# Patient Record
Sex: FEMALE | Race: WHITE | Employment: OTHER | URBAN - METROPOLITAN AREA
[De-identification: names, ages, dates, MRNs, and addresses within clinical notes are randomized per-mention and may not be internally consistent; named-entity substitution may affect disease eponyms.]

---

## 2019-04-11 ENCOUNTER — ANESTHESIA EVENT (OUTPATIENT)
Dept: SURGERY | Age: 79
End: 2019-04-11
Payer: MEDICARE

## 2019-04-12 ENCOUNTER — ANESTHESIA (OUTPATIENT)
Dept: SURGERY | Age: 79
End: 2019-04-12
Payer: MEDICARE

## 2019-04-12 ENCOUNTER — HOSPITAL ENCOUNTER (OUTPATIENT)
Age: 79
Discharge: HOME | End: 2019-04-12
Attending: PSYCHIATRY & NEUROLOGY | Admitting: PSYCHIATRY & NEUROLOGY
Payer: MEDICARE

## 2019-04-12 VITALS
HEART RATE: 84 BPM | BODY MASS INDEX: 24.41 KG/M2 | OXYGEN SATURATION: 97 % | RESPIRATION RATE: 10 BRPM | SYSTOLIC BLOOD PRESSURE: 141 MMHG | DIASTOLIC BLOOD PRESSURE: 65 MMHG | WEIGHT: 137.79 LBS | TEMPERATURE: 98.1 F | HEIGHT: 63 IN

## 2019-04-12 PROBLEM — F33.9 RECURRENT MAJOR DEPRESSIVE DISORDER (HCC): Status: ACTIVE | Noted: 2019-04-12

## 2019-04-12 PROCEDURE — 90870 ELECTROCONVULSIVE THERAPY: CPT | Performed by: PSYCHIATRY & NEUROLOGY

## 2019-04-12 PROCEDURE — 74011000250 HC RX REV CODE- 250

## 2019-04-12 PROCEDURE — 74011250636 HC RX REV CODE- 250/636

## 2019-04-12 PROCEDURE — 74780000002 HC ELECTROSHOCK THERAP RECOVERY: Performed by: PSYCHIATRY & NEUROLOGY

## 2019-04-12 RX ORDER — AMLODIPINE BESYLATE 2.5 MG/1
TABLET ORAL DAILY
COMMUNITY

## 2019-04-12 RX ORDER — SODIUM CHLORIDE, SODIUM LACTATE, POTASSIUM CHLORIDE, CALCIUM CHLORIDE 600; 310; 30; 20 MG/100ML; MG/100ML; MG/100ML; MG/100ML
100 INJECTION, SOLUTION INTRAVENOUS CONTINUOUS
Status: DISCONTINUED | OUTPATIENT
Start: 2019-04-12 | End: 2019-04-12 | Stop reason: HOSPADM

## 2019-04-12 RX ORDER — CLONAZEPAM 1 MG/1
1.5 TABLET ORAL
COMMUNITY

## 2019-04-12 RX ORDER — SUCCINYLCHOLINE CHLORIDE 20 MG/ML
INJECTION INTRAMUSCULAR; INTRAVENOUS AS NEEDED
Status: DISCONTINUED | OUTPATIENT
Start: 2019-04-12 | End: 2019-04-12 | Stop reason: HOSPADM

## 2019-04-12 RX ORDER — ONDANSETRON HYDROCHLORIDE 8 MG/1
8 TABLET, FILM COATED ORAL
COMMUNITY

## 2019-04-12 RX ORDER — SODIUM CHLORIDE 0.9 % (FLUSH) 0.9 %
5-40 SYRINGE (ML) INJECTION EVERY 8 HOURS
Status: DISCONTINUED | OUTPATIENT
Start: 2019-04-12 | End: 2019-04-12 | Stop reason: HOSPADM

## 2019-04-12 RX ORDER — CHOLECALCIFEROL TAB 125 MCG (5000 UNIT) 125 MCG
TAB ORAL DAILY
COMMUNITY

## 2019-04-12 RX ORDER — ACETAMINOPHEN 500 MG
500 TABLET ORAL
COMMUNITY

## 2019-04-12 RX ORDER — SODIUM CHLORIDE 0.9 % (FLUSH) 0.9 %
5-40 SYRINGE (ML) INJECTION AS NEEDED
Status: DISCONTINUED | OUTPATIENT
Start: 2019-04-12 | End: 2019-04-12 | Stop reason: HOSPADM

## 2019-04-12 RX ORDER — CALCIUM CARBONATE 600 MG
600 TABLET ORAL DAILY
Status: ON HOLD | COMMUNITY
End: 2019-10-23

## 2019-04-12 RX ORDER — HYDROCHLOROTHIAZIDE 12.5 MG/1
12.5 TABLET ORAL DAILY
Status: ON HOLD | COMMUNITY
End: 2019-11-20

## 2019-04-12 RX ORDER — QUETIAPINE FUMARATE 100 MG/1
50 TABLET, FILM COATED ORAL DAILY
Status: ON HOLD | COMMUNITY
End: 2019-11-20

## 2019-04-12 RX ORDER — METHOHEXITAL IN WATER/PF 100MG/10ML
SYRINGE (ML) INTRAVENOUS AS NEEDED
Status: DISCONTINUED | OUTPATIENT
Start: 2019-04-12 | End: 2019-04-12 | Stop reason: HOSPADM

## 2019-04-12 RX ORDER — HYDROMORPHONE HYDROCHLORIDE 1 MG/ML
.25-1 INJECTION, SOLUTION INTRAMUSCULAR; INTRAVENOUS; SUBCUTANEOUS
Status: DISCONTINUED | OUTPATIENT
Start: 2019-04-12 | End: 2019-04-12 | Stop reason: HOSPADM

## 2019-04-12 RX ORDER — BUPROPION HYDROCHLORIDE 150 MG/1
150 TABLET ORAL DAILY
COMMUNITY

## 2019-04-12 RX ORDER — LIDOCAINE HYDROCHLORIDE 10 MG/ML
0.1 INJECTION, SOLUTION EPIDURAL; INFILTRATION; INTRACAUDAL; PERINEURAL AS NEEDED
Status: DISCONTINUED | OUTPATIENT
Start: 2019-04-12 | End: 2019-04-12 | Stop reason: HOSPADM

## 2019-04-12 RX ORDER — GLYCOPYRROLATE 0.2 MG/ML
INJECTION INTRAMUSCULAR; INTRAVENOUS AS NEEDED
Status: DISCONTINUED | OUTPATIENT
Start: 2019-04-12 | End: 2019-04-12 | Stop reason: HOSPADM

## 2019-04-12 RX ORDER — MAGNESIUM CITRATE
148 SOLUTION, ORAL ORAL DAILY
COMMUNITY

## 2019-04-12 RX ADMIN — Medication 60 MG: at 08:26

## 2019-04-12 RX ADMIN — SUCCINYLCHOLINE CHLORIDE 50 MG: 20 INJECTION INTRAMUSCULAR; INTRAVENOUS at 08:26

## 2019-04-12 RX ADMIN — GLYCOPYRROLATE 0.2 MG: 0.2 INJECTION INTRAMUSCULAR; INTRAVENOUS at 08:22

## 2019-04-12 NOTE — BH NOTES
ECT DISCHARGE SUMMARY        IDENTIFICATION:    Patient Name  Jana Knight   Date of Birth 1940   [facility] [id number]   Medical Record Number  [id number]      Age  66 y.o. Admit date:  4/12/2019    Date of Service  4/12/2019              PROVISIONAL & DISCHARGE DIAGNOSES:      Active Hospital Problems    Recurrent major depressive disorder [facility])               ECT:  # Q 6 weeks Maintenance/Continuation treatment   Jana Knight admitted to the PACU for ECT. Patient is medically cleared and NPO for procedure. Patient received Bilateral  35% Energy, under general anesthesia. Jana Knight with a good, well generalized seizure of 27 seconds by EEG. Recovery was unremarkable and with no complications. Patient gave informed consent for ECT treatment. Please see anesthesia report for details regarding medications administered during procedure. DISCHARGE MEDICATIONS: (no changes made). Informed consent given for the use of following psychotropic medications:  Current Discharge Medication List      CONTINUE these medications which have NOT CHANGED    Details   acetaminophen (TYLENOL) 500 mg tablet Take 500 mg by mouth every six (6) hours as needed for Pain (pain). buPROPion XL (WELLBUTRIN XL) 150 mg tablet Take 150 mg by mouth daily. magnesium citrate solution Take 148 mL by mouth daily. QUEtiapine (SEROQUEL) 100 mg tablet Take 50 mg by mouth daily. cholecalciferol, VITAMIN D3, (VITAMIN D3) 5,000 unit tab tablet Take  by mouth daily. amLODIPine (NORVASC) 2.5 mg tablet Take  by mouth daily. hydroCHLOROthiazide (HYDRODIURIL) 12.5 mg tablet Take 12.5 mg by mouth daily. calcium carbonate (CALTREX) 600 mg calcium (1,500 mg) tablet Take 600 mg by mouth daily. clonazePAM (KLONOPIN) 1 mg tablet Take 1.5 mg by mouth nightly. Menthol-Zinc Oxide (REMEDY) 0.2-20 % pste topical paste by Apply Externally route DIALYSIS PRN (diaper change).       dextran 70-hypromellose (ARTIFICIAL TEARS,[id number],) ophthalmic solution Administer 1 Drop to both eyes as needed. ondansetron hcl (ZOFRAN) 8 mg tablet Take 8 mg by mouth every eight (8) hours as needed for Nausea. DISPOSITION:    Home. Patient to f/u with o/p ECT in 6 weeks months. Pt to f/u with regular o/p psychiatrist as indicated/directed. TYPE OF DISCHARGE: REGULAR               CONDITION AT DISCHARGE: stable  Overall, patient seems to be doing well with ECT. SIGNED:    Brandi Damon MD  2019                              ECT DISCHARGE INSTRUCTIONS      NAME: Billy Dalton   :  1940   MRN:  [id number]     Date/Time:  2019 8:39 AM    ADMIT DATE: 2019     DISCHARGE DATE: 2019     DISCHARGE DIAGNOSIS:  No discharge information exists for this patient. Recommended diet:  As tolerated. Recommended activity:      Have a responsible person stay with the patient for 24 hours after the treatment, some temporary confusion may be noticed. Do not allow a patient to operate a motor vehicle for at least 24 hours and all the confusion has cleared. Do not drink alcoholic beverages for 48 hours past treatment. Do not sign any legal documents. Do not make any critical decisions for 24 hours. Advance diet as tolerated. Start with liquids and advance it nausea is not present. Understand that memory for recent events, phone numbers, addresses, ect. May be decreased for a short period of time. Report any persistant nausea or pain to the treating physician. Patient receiving ECT while in the hospital should also not attempt to ambulate without assistance, please use tap bell which will be provide. If you have questions regarding the hospital related prescriptions or hospital related issues please call [address] [place] [address] at [phone].     If you experience any of the following symptoms then please call your primary care physician/outpatient psychiatrist or return to the emergency room if you cannot get hold of your doctor: Fever, chills, nausea, vomiting, diarrhea, change in mentation, falling, bleeding, shortness of breath. Follow Up:  Continue outpatient psychiatric follow-up visits with your personal psychiatrist.       Information obtained by :  I understand that if any problems occur once I am at home I am to contact my physician. I understand and acknowledge receipt of the instructions indicated above.

## 2019-04-12 NOTE — ANESTHESIA POSTPROCEDURE EVALUATION
Procedure(s): ELECTRO CONVULSIVE THERAPY. general 
 
Anesthesia Post Evaluation Multimodal analgesia: multimodal analgesia not used between 6 hours prior to anesthesia start to PACU discharge Patient location during evaluation: PACU Patient participation: complete - patient participated Level of consciousness: awake Pain management: adequate Airway patency: patent Anesthetic complications: no 
Cardiovascular status: acceptable, blood pressure returned to baseline and hemodynamically stable Respiratory status: acceptable Hydration status: acceptable Vitals Value Taken Time /75 4/12/2019  8:50 AM  
Temp 36.5 °C (97.7 °F) 4/12/2019  8:41 AM  
Pulse 86 4/12/2019  8:54 AM  
Resp 11 4/12/2019  8:54 AM  
SpO2 97 % 4/12/2019  8:54 AM  
Vitals shown include unvalidated device data.

## 2019-04-12 NOTE — ANESTHESIA PREPROCEDURE EVALUATION
Relevant Problems No relevant active problems Anesthetic History No history of anesthetic complications Review of Systems / Medical History Patient summary reviewed and pertinent labs reviewed Pulmonary Within defined limits Neuro/Psych Psychiatric history Cardiovascular Hypertension GI/Hepatic/Renal 
Within defined limits Endo/Other Within defined limits Other Findings Physical Exam 
 
Airway Mallampati: II 
TM Distance: 4 - 6 cm Neck ROM: normal range of motion Mouth opening: Normal 
 
 Cardiovascular Regular rate and rhythm,  S1 and S2 normal,  no murmur, click, rub, or gallop Rhythm: regular Rate: normal 
 
 
 
 Dental 
 
Dentition: Poor dentition Pulmonary Breath sounds clear to auscultation Abdominal 
GI exam deferred Other Findings Anesthetic Plan ASA: 3 Anesthesia type: general 
 
 
 
 
Induction: Intravenous Anesthetic plan and risks discussed with: Patient

## 2019-04-12 NOTE — BH NOTES
ECT PROCEDURE NOTE      IDENTIFICATION:    Patient Name  Jeffrey Conrad   Date of Birth 1940   [facility] [id number]   Medical Record Number  [id number]      Age  66 y.o. PCP Karla Rogers MD   Admit date:  4/12/2019    Room Number  OR/PL  @ [facility]   Date of Service  4/12/2019            ECT:  # q6 weeks Maintenance/Continuation treatment   Jeffrey Conrad admitted to the PACU for ECT. Patient is medically cleared and NPO for procedure. Patient received Bilateral 35% Energy, under general anesthesia. Jeffrey Conrad with a good, well generalized seizure of 27 seconds by EEG and  seconds by EMG. Recovery was unremarkable and with no complications. Patient gave informed consent for ECT treatment.     Anesthesia medications administered during procedure:  Brevital: 60 mg  Propofol: 0 mg  Glycopyrrolate: 0.2 mg  Succinylcholine: 50 mg   Esmolol:  mg  Lorazepam:  mg  Ketamine:  mg  Zofran:   mg    Toradol:  mg  Lidocaine:  mg  Caffeine Benzoate: mg       SIGNED:    Desi Patton MD  4/12/2019

## 2019-04-26 ENCOUNTER — ANESTHESIA EVENT (OUTPATIENT)
Dept: SURGERY | Age: 79
End: 2019-04-26
Payer: MEDICARE

## 2019-05-03 ENCOUNTER — ANESTHESIA (OUTPATIENT)
Dept: SURGERY | Age: 79
End: 2019-05-03
Payer: MEDICARE

## 2019-05-03 ENCOUNTER — HOSPITAL ENCOUNTER (OUTPATIENT)
Age: 79
Setting detail: OUTPATIENT SURGERY
Discharge: HOME | End: 2019-05-03
Attending: PSYCHIATRY & NEUROLOGY | Admitting: PSYCHIATRY & NEUROLOGY
Payer: MEDICARE

## 2019-05-03 VITALS
WEIGHT: 155 LBS | TEMPERATURE: 98.6 F | RESPIRATION RATE: 16 BRPM | OXYGEN SATURATION: 98 % | HEART RATE: 90 BPM | HEIGHT: 63 IN | SYSTOLIC BLOOD PRESSURE: 138 MMHG | DIASTOLIC BLOOD PRESSURE: 87 MMHG | BODY MASS INDEX: 27.46 KG/M2

## 2019-05-03 PROCEDURE — 90870 ELECTROCONVULSIVE THERAPY: CPT | Performed by: PSYCHIATRY & NEUROLOGY

## 2019-05-03 PROCEDURE — 74780000002 HC ELECTROSHOCK THERAP RECOVERY: Performed by: PSYCHIATRY & NEUROLOGY

## 2019-05-03 PROCEDURE — 74011250636 HC RX REV CODE- 250/636

## 2019-05-03 RX ORDER — SODIUM CHLORIDE 0.9 % (FLUSH) 0.9 %
5-40 SYRINGE (ML) INJECTION AS NEEDED
Status: DISCONTINUED | OUTPATIENT
Start: 2019-05-03 | End: 2019-05-03 | Stop reason: HOSPADM

## 2019-05-03 RX ORDER — SODIUM CHLORIDE 0.9 % (FLUSH) 0.9 %
5-40 SYRINGE (ML) INJECTION EVERY 8 HOURS
Status: DISCONTINUED | OUTPATIENT
Start: 2019-05-03 | End: 2019-05-03 | Stop reason: HOSPADM

## 2019-05-03 RX ORDER — SUCCINYLCHOLINE CHLORIDE 20 MG/ML
INJECTION INTRAMUSCULAR; INTRAVENOUS AS NEEDED
Status: DISCONTINUED | OUTPATIENT
Start: 2019-05-03 | End: 2019-05-03 | Stop reason: HOSPADM

## 2019-05-03 RX ORDER — SODIUM CHLORIDE 9 MG/ML
50 INJECTION, SOLUTION INTRAVENOUS CONTINUOUS
Status: DISCONTINUED | OUTPATIENT
Start: 2019-05-03 | End: 2019-05-03 | Stop reason: HOSPADM

## 2019-05-03 RX ORDER — LIDOCAINE HYDROCHLORIDE 10 MG/ML
0.1 INJECTION, SOLUTION EPIDURAL; INFILTRATION; INTRACAUDAL; PERINEURAL AS NEEDED
Status: DISCONTINUED | OUTPATIENT
Start: 2019-05-03 | End: 2019-05-03 | Stop reason: HOSPADM

## 2019-05-03 RX ORDER — HYDROMORPHONE HYDROCHLORIDE 1 MG/ML
0.5 INJECTION, SOLUTION INTRAMUSCULAR; INTRAVENOUS; SUBCUTANEOUS
Status: DISCONTINUED | OUTPATIENT
Start: 2019-05-03 | End: 2019-05-03 | Stop reason: HOSPADM

## 2019-05-03 RX ORDER — SODIUM CHLORIDE, SODIUM LACTATE, POTASSIUM CHLORIDE, CALCIUM CHLORIDE 600; 310; 30; 20 MG/100ML; MG/100ML; MG/100ML; MG/100ML
50 INJECTION, SOLUTION INTRAVENOUS CONTINUOUS
Status: DISCONTINUED | OUTPATIENT
Start: 2019-05-03 | End: 2019-05-03 | Stop reason: HOSPADM

## 2019-05-03 RX ADMIN — SUCCINYLCHOLINE CHLORIDE 50 MG: 20 INJECTION INTRAMUSCULAR; INTRAVENOUS at 09:04

## 2019-05-03 NOTE — ANESTHESIA PREPROCEDURE EVALUATION
Relevant Problems No relevant active problems Anesthetic History No history of anesthetic complications Review of Systems / Medical History Patient summary reviewed and pertinent labs reviewed Pulmonary Within defined limits Neuro/Psych Psychiatric history Cardiovascular Hypertension GI/Hepatic/Renal 
Within defined limits Endo/Other Within defined limits Other Findings Relevant Problems No relevant active problems Anesthetic History No history of anesthetic complications Review of Systems / Medical History Patient summary reviewed and pertinent labs reviewed Pulmonary Within defined limits Neuro/Psych Psychiatric history Cardiovascular Hypertension GI/Hepatic/Renal 
Within defined limits Endo/Other Within defined limits Other Findings Physical Exam 
 
Airway Mallampati: II 
TM Distance: 4 - 6 cm Neck ROM: normal range of motion Mouth opening: Normal 
 
 Cardiovascular Regular rate and rhythm,  S1 and S2 normal,  no murmur, click, rub, or gallop Rhythm: regular Rate: normal 
 
 
 
 Dental 
 
Dentition: Poor dentition Pulmonary Breath sounds clear to auscultation Abdominal 
GI exam deferred Other Findings Anesthetic Plan ASA: 3 Anesthesia type: general 
 
 
 
 
Induction: Intravenous Anesthetic plan and risks discussed with: Patient Relevant Problems No relevant active problems Anesthetic History No history of anesthetic complications Review of Systems / Medical History Patient summary reviewed and pertinent labs reviewed Pulmonary Within defined limits Neuro/Psych Psychiatric history Cardiovascular Hypertension GI/Hepatic/Renal 
Within defined limits Endo/Other Within defined limits Other Findings Physical Exam 
 
Airway Mallampati: II 
TM Distance: 4 - 6 cm Neck ROM: normal range of motion Mouth opening: Normal 
 
 Cardiovascular Regular rate and rhythm,  S1 and S2 normal,  no murmur, click, rub, or gallop Rhythm: regular Rate: normal 
 
 
 
 Dental 
 
Dentition: Poor dentition Pulmonary Breath sounds clear to auscultation Abdominal 
GI exam deferred Other Findings Anesthetic Plan ASA: 3 Anesthesia type: general 
 
 
 
 
Induction: Intravenous Anesthetic plan and risks discussed with: Patient Physical Exam 
 
Airway Mallampati: II 
TM Distance: 4 - 6 cm Neck ROM: normal range of motion Mouth opening: Normal 
 
 Cardiovascular Regular rate and rhythm,  S1 and S2 normal,  no murmur, click, rub, or gallop Rhythm: regular Rate: normal 
 
 
 
 Dental 
 
Dentition: Poor dentition Pulmonary Breath sounds clear to auscultation Abdominal 
GI exam deferred Other Findings Anesthetic Plan ASA: 3 Anesthesia type: general 
 
 
 
 
Induction: Intravenous Anesthetic plan and risks discussed with: Patient

## 2019-05-03 NOTE — DISCHARGE INSTRUCTIONS
ECT DISCHARGE INSTRUCTIONS      NAME: Jana Knight   :  1940   MRN:  [id number]     Date/Time:  5/3/2019 9:09 AM    ADMIT DATE: 5/3/2019     DISCHARGE DATE: 5/3/2019     DISCHARGE DIAGNOSIS:  There are no discharge diagnoses documented for the most recent discharge. Recommended diet:  As tolerated. Recommended activity:      Have a responsible person stay with the patient for 24 hours after the treatment, some temporary confusion may be noticed. Do not allow a patient to operate a motor vehicle for at least 24 hours and all the confusion has cleared. Do not drink alcoholic beverages for 48 hours past treatment. Do not sign any legal documents. Do not make any critical decisions for 24 hours. Advance diet as tolerated. Start with liquids and advance it nausea is not present. Understand that memory for recent events, phone numbers, addresses, ect. May be decreased for a short period of time. Report any persistant nausea or pain to the treating physician. Patient receiving ECT while in the hospital should also not attempt to ambulate without assistance, please use tap bell which will be provide. If you have questions regarding the hospital related prescriptions or hospital related issues please call [address] [place] [address] at [phone]. If you experience any of the following symptoms then please call your primary care physician/outpatient psychiatrist or return to the emergency room if you cannot get hold of your doctor: Fever, chills, nausea, vomiting, diarrhea, change in mentation, falling, bleeding, shortness of breath. Follow Up:  Continue outpatient psychiatric follow-up visits with your personal psychiatrist.       Information obtained by :  I understand that if any problems occur once I am at home I am to contact my physician. I understand and acknowledge receipt of the instructions indicated above. Physician's or R.N.'s Signature                                                             Date/Time                                                                                                                                              Patient or Representative Signature                                                     Date/Time            DISCHARGE SUMMARY from Nurse    PATIENT INSTRUCTIONS:    After general anesthesia or intravenous sedation, for 24 hours or while taking prescription Narcotics:  · Limit your activities  · Do not drive and operate hazardous machinery  · Do not make important personal or business decisions  · Do  not drink alcoholic beverages  · If you have not urinated within 8 hours after discharge, please contact your surgeon on call. Report the following to your surgeon:  · Excessive pain, swelling, redness or odor of or around the surgical area  · Temperature over 100.5  · Nausea and vomiting lasting longer than 4 hours or if unable to take medications  · Any signs of decreased circulation or nerve impairment to extremity: change in color, persistent  numbness, tingling, coldness or increase pain  · Any questions    *  Please give a list of your current medications to your Primary Care Provider. *  Please update this list whenever your medications are discontinued, doses are      changed, or new medications (including over-the-counter products) are added. *  Please carry medication information at all times in case of emergency situations. These are general instructions for a healthy lifestyle:    No smoking/ No tobacco products/ Avoid exposure to second hand smoke  Surgeon General's Warning:  Quitting smoking now greatly reduces serious risk to your health.     Obesity, smoking, and sedentary lifestyle greatly increases your risk for illness    A healthy diet, regular physical exercise & weight monitoring are important for maintaining a healthy lifestyle    You may be retaining fluid if you have a history of heart failure or if you experience any of the following symptoms:  Weight gain of 3 pounds or more overnight or 5 pounds in a week, increased swelling in our hands or feet or shortness of breath while lying flat in bed. Please call your doctor as soon as you notice any of these symptoms; do not wait until your next office visit. Recognize signs and symptoms of STROKE:    F-face looks uneven    A-arms unable to move or move unevenly    S-speech slurred or non-existent    T-time-call 911 as soon as signs and symptoms begin-DO NOT go       Back to bed or wait to see if you get better-TIME IS BRAIN. Warning Signs of HEART ATTACK     Call 911 if you have these symptoms:   Chest discomfort. Most heart attacks involve discomfort in the center of the chest that lasts more than a few minutes, or that goes away and comes back. It can feel like uncomfortable pressure, squeezing, fullness, or pain.  Discomfort in other areas of the upper body. Symptoms can include pain or discomfort in one or both arms, the back, neck, jaw, or stomach.  Shortness of breath with or without chest discomfort.  Other signs may include breaking out in a cold sweat, nausea, or lightheadedness. Don't wait more than five minutes to call 911 - MINUTES MATTER! Fast action can save your life. Calling 911 is almost always the fastest way to get lifesaving treatment. Emergency Medical Services staff can begin treatment when they arrive -- up to an hour sooner than if someone gets to the hospital by car. The discharge information has been reviewed with the patient and caregiver. The patient and caregiver verbalized understanding.   Discharge medications reviewed with the patient and caregiver and appropriate educational materials and side effects teaching were provided.   ___________________________________________________________________________________________________________________________________

## 2019-05-03 NOTE — ANESTHESIA POSTPROCEDURE EVALUATION
Procedure(s): ELECTRO CONVULSIVE THERAPY. general 
 
Anesthesia Post Evaluation Multimodal analgesia: multimodal analgesia not used between 6 hours prior to anesthesia start to PACU discharge Patient location during evaluation: PACU Patient participation: complete - patient cannot participate Level of consciousness: awake Pain management: adequate Airway patency: patent Anesthetic complications: no 
Cardiovascular status: acceptable Respiratory status: acceptable Hydration status: acceptable Post anesthesia nausea and vomiting:  none Vitals Value Taken Time /61 5/3/2019 10:00 AM  
Temp 37 °C (98.6 °F) 5/3/2019  9:20 AM  
Pulse 85 5/3/2019 10:10 AM  
Resp 16 5/3/2019 10:10 AM  
SpO2 97 % 5/3/2019 10:10 AM  
Vitals shown include unvalidated device data.

## 2019-05-03 NOTE — PROCEDURES
ECT PROCEDURE NOTE      IDENTIFICATION:    Patient Name  Levar Limon   Date of Birth 1940   [facility] [id number]   Medical Record Number  [id number]      Age  66 y.o. PCP Sujatha Brandon MD   Admit date:  5/3/2019    Room Number  PACU/PL  @ [facility]   Date of Service  5/3/2019            Electro Convulsive Therapy:  Treatment number 23     Maintenance ECT yes Q4 weeks   Levar Limon was admitted to the PACU for ECT. Patient was medically cleared and NPO for procedure. Patient gave informed consent for ECT treatment. Patient received     Bilateral ECT yes    Right Unilateral ECT     at 40 % Energy, under general anesthesia. Levar Limon with a good, well generalized seizure of 26 seconds on observation of the motor manifestations of the seizure. EEG duration was NA secs. Recovery was unremarkable and with no complications.      Change in Energy %:             Anesthesia medications administered during procedure:  Brevital:  60 mg  Change for next treament:     Succinylcholine: 50 mg  Change for next treatment:  Decrease to 40mg    Propofol: mg  Glycopyrrolate:  mg  Labetalol:  mg  Esmolol:  mg  Lorazepam:  mg  Ketamine:  mg  Zofran:   mg    Toradol:  mg  Lidocaine:  mg  Caffeine Benzoate: mg       Skin to be cleaned with Alcohol swab prior to procedure    SIGNED:    Bella Downs MD  5/3/2019

## 2019-05-03 NOTE — PERIOP NOTES
Discharge instructions explained to patient and RN, pt taken via wheelchair out of PACU, pt stable for discharge

## 2019-05-23 ENCOUNTER — ANESTHESIA EVENT (OUTPATIENT)
Dept: SURGERY | Age: 79
End: 2019-05-23
Payer: COMMERCIAL

## 2019-05-23 RX ORDER — SODIUM CHLORIDE 0.9 % (FLUSH) 0.9 %
5-40 SYRINGE (ML) INJECTION EVERY 8 HOURS
Status: CANCELLED | OUTPATIENT
Start: 2019-05-23

## 2019-05-23 RX ORDER — HYDROMORPHONE HYDROCHLORIDE 1 MG/ML
0.5 INJECTION, SOLUTION INTRAMUSCULAR; INTRAVENOUS; SUBCUTANEOUS
Status: CANCELLED | OUTPATIENT
Start: 2019-05-23

## 2019-05-23 RX ORDER — SODIUM CHLORIDE 0.9 % (FLUSH) 0.9 %
5-40 SYRINGE (ML) INJECTION AS NEEDED
Status: CANCELLED | OUTPATIENT
Start: 2019-05-23

## 2019-05-23 RX ORDER — FENTANYL CITRATE 50 UG/ML
25 INJECTION, SOLUTION INTRAMUSCULAR; INTRAVENOUS
Status: CANCELLED | OUTPATIENT
Start: 2019-05-23

## 2019-05-24 ENCOUNTER — HOSPITAL ENCOUNTER (OUTPATIENT)
Age: 79
Setting detail: OUTPATIENT SURGERY
Discharge: HOME | End: 2019-05-24
Attending: PSYCHIATRY & NEUROLOGY | Admitting: PSYCHIATRY & NEUROLOGY
Payer: COMMERCIAL

## 2019-05-24 ENCOUNTER — ANESTHESIA (OUTPATIENT)
Dept: SURGERY | Age: 79
End: 2019-05-24
Payer: COMMERCIAL

## 2019-05-24 VITALS
SYSTOLIC BLOOD PRESSURE: 132 MMHG | BODY MASS INDEX: 27.46 KG/M2 | DIASTOLIC BLOOD PRESSURE: 55 MMHG | WEIGHT: 154.98 LBS | HEART RATE: 92 BPM | OXYGEN SATURATION: 96 % | RESPIRATION RATE: 20 BRPM | HEIGHT: 63 IN | TEMPERATURE: 98.5 F

## 2019-05-24 PROCEDURE — 90870 ELECTROCONVULSIVE THERAPY: CPT | Performed by: PSYCHIATRY & NEUROLOGY

## 2019-05-24 PROCEDURE — 74011250636 HC RX REV CODE- 250/636: Performed by: ANESTHESIOLOGY

## 2019-05-24 PROCEDURE — 74780000002 HC ELECTROSHOCK THERAP RECOVERY: Performed by: PSYCHIATRY & NEUROLOGY

## 2019-05-24 PROCEDURE — 74011250636 HC RX REV CODE- 250/636

## 2019-05-24 PROCEDURE — 74011000250 HC RX REV CODE- 250

## 2019-05-24 RX ORDER — SODIUM CHLORIDE 0.9 % (FLUSH) 0.9 %
5-40 SYRINGE (ML) INJECTION EVERY 8 HOURS
Status: DISCONTINUED | OUTPATIENT
Start: 2019-05-24 | End: 2019-05-24 | Stop reason: HOSPADM

## 2019-05-24 RX ORDER — SODIUM CHLORIDE, SODIUM LACTATE, POTASSIUM CHLORIDE, CALCIUM CHLORIDE 600; 310; 30; 20 MG/100ML; MG/100ML; MG/100ML; MG/100ML
50 INJECTION, SOLUTION INTRAVENOUS CONTINUOUS
Status: DISCONTINUED | OUTPATIENT
Start: 2019-05-24 | End: 2019-05-24 | Stop reason: HOSPADM

## 2019-05-24 RX ORDER — SODIUM CHLORIDE 0.9 % (FLUSH) 0.9 %
5-40 SYRINGE (ML) INJECTION AS NEEDED
Status: DISCONTINUED | OUTPATIENT
Start: 2019-05-24 | End: 2019-05-24 | Stop reason: HOSPADM

## 2019-05-24 RX ORDER — SODIUM CHLORIDE 9 MG/ML
50 INJECTION, SOLUTION INTRAVENOUS CONTINUOUS
Status: DISCONTINUED | OUTPATIENT
Start: 2019-05-24 | End: 2019-05-24 | Stop reason: HOSPADM

## 2019-05-24 RX ORDER — SUCCINYLCHOLINE CHLORIDE 20 MG/ML
INJECTION INTRAMUSCULAR; INTRAVENOUS AS NEEDED
Status: DISCONTINUED | OUTPATIENT
Start: 2019-05-24 | End: 2019-05-24 | Stop reason: HOSPADM

## 2019-05-24 RX ORDER — LIDOCAINE HYDROCHLORIDE 10 MG/ML
0.1 INJECTION, SOLUTION EPIDURAL; INFILTRATION; INTRACAUDAL; PERINEURAL AS NEEDED
Status: DISCONTINUED | OUTPATIENT
Start: 2019-05-24 | End: 2019-05-24 | Stop reason: HOSPADM

## 2019-05-24 RX ADMIN — SODIUM CHLORIDE: 900 INJECTION, SOLUTION INTRAVENOUS at 09:59

## 2019-05-24 RX ADMIN — SUCCINYLCHOLINE CHLORIDE 40 MG: 20 INJECTION INTRAMUSCULAR; INTRAVENOUS at 10:27

## 2019-05-24 NOTE — ANESTHESIA POSTPROCEDURE EVALUATION
Procedure(s):  ELECTRO CONVULSIVE THERAPY. general    Anesthesia Post Evaluation      Multimodal analgesia: multimodal analgesia not used between 6 hours prior to anesthesia start to PACU discharge  Patient location during evaluation: PACU  Patient participation: complete - patient participated  Level of consciousness: awake and alert  Pain management: adequate  Airway patency: patent  Anesthetic complications: no  Cardiovascular status: acceptable  Respiratory status: acceptable  Hydration status: acceptable  Post anesthesia nausea and vomiting:  none      Vitals Value Taken Time   /55 5/24/2019 11:30 AM   Temp 36.9 °C (98.5 °F) 5/24/2019 11:29 AM   Pulse 92 5/24/2019 11:30 AM   Resp 15 5/24/2019 11:30 AM   SpO2 97 % 5/24/2019 11:30 AM   Vitals shown include unvalidated device data.

## 2019-05-24 NOTE — DISCHARGE INSTRUCTIONS
ECT DISCHARGE INSTRUCTIONS      NAME: Kathi Galeas   :  1940   MRN:  [id number]     Date/Time:  2019 10:32 AM    ADMIT DATE: 2019     DISCHARGE DATE: 2019     DISCHARGE DIAGNOSIS:  There are no discharge diagnoses documented for the most recent discharge. Recommended diet:  As tolerated. Recommended activity:      Have a responsible person stay with the patient for 24 hours after the treatment, some temporary confusion may be noticed. Do not allow a patient to operate a motor vehicle for at least 24 hours and all the confusion has cleared. Do not drink alcoholic beverages for 48 hours past treatment. Do not sign any legal documents. Do not make any critical decisions for 24 hours. Advance diet as tolerated. Start with liquids and advance it nausea is not present. Understand that memory for recent events, phone numbers, addresses, ect. May be decreased for a short period of time. Report any persistant nausea or pain to the treating physician. Patient receiving ECT while in the hospital should also not attempt to ambulate without assistance, please use tap bell which will be provide. If you experience any of the following symptoms then please call your primary care physician/outpatient psychiatrist or return to the emergency room if you cannot get hold of your doctor: Fever, chills, nausea, vomiting, diarrhea, change in mentation, falling, bleeding, shortness of breath. Follow Up:  Continue outpatient psychiatric follow-up visits with your personal psychiatrist.       Information obtained by :  I understand that if any problems occur once I am at home I am to contact my physician. I understand and acknowledge receipt of the instructions indicated above.                                                                                                                                            Physician's or R.N.'s Signature Date/Time                                                                                                                                              Patient or Representative Signature                                                     Date/Time            DISCHARGE SUMMARY from Nurse    PATIENT INSTRUCTIONS:    After general anesthesia or intravenous sedation, for 24 hours or while taking prescription Narcotics:  · Limit your activities  · Do not drive and operate hazardous machinery  · Do not make important personal or business decisions  · Do  not drink alcoholic beverages  · If you have not urinated within 8 hours after discharge, please contact your surgeon on call. Report the following to your surgeon:  · Excessive pain, swelling, redness or odor of or around the surgical area  · Temperature over 100.5  · Nausea and vomiting lasting longer than 4 hours or if unable to take medications  · Any signs of decreased circulation or nerve impairment to extremity: change in color, persistent  numbness, tingling, coldness or increase pain  · Any questions    What to do at Home:      *  Please give a list of your current medications to your Primary Care Provider. *  Please update this list whenever your medications are discontinued, doses are      changed, or new medications (including over-the-counter products) are added. *  Please carry medication information at all times in case of emergency situations. These are general instructions for a healthy lifestyle:    No smoking/ No tobacco products/ Avoid exposure to second hand smoke  Surgeon General's Warning:  Quitting smoking now greatly reduces serious risk to your health.     Obesity, smoking, and sedentary lifestyle greatly increases your risk for illness    A healthy diet, regular physical exercise & weight monitoring are important for maintaining a healthy lifestyle    You may be retaining fluid if you have a history of heart failure or if you experience any of the following symptoms:  Weight gain of 3 pounds or more overnight or 5 pounds in a week, increased swelling in our hands or feet or shortness of breath while lying flat in bed. Please call your doctor as soon as you notice any of these symptoms; do not wait until your next office visit. Recognize signs and symptoms of STROKE:    F-face looks uneven    A-arms unable to move or move unevenly    S-speech slurred or non-existent    T-time-call 911 as soon as signs and symptoms begin-DO NOT go       Back to bed or wait to see if you get better-TIME IS BRAIN. Warning Signs of HEART ATTACK     Call 911 if you have these symptoms:   Chest discomfort. Most heart attacks involve discomfort in the center of the chest that lasts more than a few minutes, or that goes away and comes back. It can feel like uncomfortable pressure, squeezing, fullness, or pain.  Discomfort in other areas of the upper body. Symptoms can include pain or discomfort in one or both arms, the back, neck, jaw, or stomach.  Shortness of breath with or without chest discomfort.  Other signs may include breaking out in a cold sweat, nausea, or lightheadedness. Don't wait more than five minutes to call 911 - MINUTES MATTER! Fast action can save your life. Calling 911 is almost always the fastest way to get lifesaving treatment. Emergency Medical Services staff can begin treatment when they arrive -- up to an hour sooner than if someone gets to the hospital by car. The discharge information has been reviewed with the patient and caregiver. The patient and caregiver verbalized understanding. Discharge medications reviewed with the patient and caregiver and appropriate educational materials and side effects teaching were provided.   ___________________________________________________________________________________________________________________________________

## 2019-05-24 NOTE — PERIOP NOTES
Pin pad device not functioning; nephew unable to sign    Discharge instructions explained to patient and nephew, pt taken via wheelchair out of PACU, pt stable for discharge

## 2019-05-24 NOTE — PROCEDURES
ECT PROCEDURE NOTE      IDENTIFICATION:    Patient Name  Chidi Diaz   Date of Birth 1940   [facility] [id number]   Medical Record Number  [id number]      Age  66 y.o. PCP Author MD Serena   Admit date:  5/24/2019    Room Number  PACU/PL  @ [facility]   Date of Service  5/24/2019            Electro Convulsive Therapy:  Treatment number 24    Maintenance ECT yes Q4 weeks   Chidi Diaz was admitted to the PACU for ECT. Patient was medically cleared and NPO for procedure. Patient gave informed consent for ECT treatment. Patient received     Bilateral ECT yes    Right Unilateral ECT     at 40 % Energy, under general anesthesia. Chidi Diaz with a good, well generalized seizure of 23 seconds on observation of the motor manifestations of the seizure. EEG duration was NA secs. Recovery was unremarkable and with no complications.      Change in Energy %:             Anesthesia medications administered during procedure:  Brevital:  60 mg  Change for next treament:     Succinylcholine: 40 mg  Change for next treatment:    Propofol: mg  Glycopyrrolate:  mg  Labetalol:  mg  Esmolol:  mg  Lorazepam:  mg  Ketamine:  mg  Zofran:   mg    Toradol:  mg  Lidocaine:  mg  Caffeine Benzoate: mg       Skin to be cleaned with Alcohol swab prior to procedure    SIGNED:    Aleta Nguyen MD  5/24/2019

## 2019-05-24 NOTE — PERIOP NOTES
Handoff Report from Operating Room to PACU    Report received from Dr. Phillip Villalba and Melissa Melara RN regarding Media Wales. Surgeon(s):  Norma Bardales MD  And Procedure(s) (LRB):  ELECTRO CONVULSIVE THERAPY (N/A)  confirmed   with allergies discussed. Anesthesia type, drugs, patient history, complications, estimated blood loss, vital signs, intake and output, and last pain medication, lines and temperature were reviewed.

## 2019-06-13 ENCOUNTER — ANESTHESIA EVENT (OUTPATIENT)
Dept: SURGERY | Age: 79
End: 2019-06-13
Payer: MEDICARE

## 2019-06-14 ENCOUNTER — HOSPITAL ENCOUNTER (OUTPATIENT)
Age: 79
Setting detail: OUTPATIENT SURGERY
Discharge: NURSING, REHAB OR LONG-TERM CARE | End: 2019-06-14
Attending: PSYCHIATRY & NEUROLOGY | Admitting: PSYCHIATRY & NEUROLOGY
Payer: MEDICARE

## 2019-06-14 ENCOUNTER — ANESTHESIA (OUTPATIENT)
Dept: SURGERY | Age: 79
End: 2019-06-14
Payer: MEDICARE

## 2019-06-14 VITALS
SYSTOLIC BLOOD PRESSURE: 123 MMHG | TEMPERATURE: 97 F | WEIGHT: 154 LBS | HEART RATE: 83 BPM | RESPIRATION RATE: 11 BRPM | OXYGEN SATURATION: 94 % | DIASTOLIC BLOOD PRESSURE: 63 MMHG | BODY MASS INDEX: 27.29 KG/M2 | HEIGHT: 63 IN

## 2019-06-14 PROCEDURE — 74780000002 HC ELECTROSHOCK THERAP RECOVERY: Performed by: PSYCHIATRY & NEUROLOGY

## 2019-06-14 PROCEDURE — 74011250636 HC RX REV CODE- 250/636

## 2019-06-14 PROCEDURE — 74011000250 HC RX REV CODE- 250

## 2019-06-14 PROCEDURE — 90870 ELECTROCONVULSIVE THERAPY: CPT | Performed by: PSYCHIATRY & NEUROLOGY

## 2019-06-14 RX ORDER — DIPHENHYDRAMINE HYDROCHLORIDE 50 MG/ML
12.5 INJECTION, SOLUTION INTRAMUSCULAR; INTRAVENOUS AS NEEDED
Status: DISCONTINUED | OUTPATIENT
Start: 2019-06-14 | End: 2019-06-14 | Stop reason: HOSPADM

## 2019-06-14 RX ORDER — FLUMAZENIL 0.1 MG/ML
0.2 INJECTION INTRAVENOUS
Status: DISCONTINUED | OUTPATIENT
Start: 2019-06-14 | End: 2019-06-14 | Stop reason: HOSPADM

## 2019-06-14 RX ORDER — SODIUM CHLORIDE, SODIUM LACTATE, POTASSIUM CHLORIDE, CALCIUM CHLORIDE 600; 310; 30; 20 MG/100ML; MG/100ML; MG/100ML; MG/100ML
125 INJECTION, SOLUTION INTRAVENOUS CONTINUOUS
Status: DISCONTINUED | OUTPATIENT
Start: 2019-06-14 | End: 2019-06-14 | Stop reason: HOSPADM

## 2019-06-14 RX ORDER — HYDROMORPHONE HYDROCHLORIDE 1 MG/ML
.25-1 INJECTION, SOLUTION INTRAMUSCULAR; INTRAVENOUS; SUBCUTANEOUS
Status: DISCONTINUED | OUTPATIENT
Start: 2019-06-14 | End: 2019-06-14 | Stop reason: HOSPADM

## 2019-06-14 RX ORDER — LIDOCAINE HYDROCHLORIDE 10 MG/ML
0.1 INJECTION, SOLUTION EPIDURAL; INFILTRATION; INTRACAUDAL; PERINEURAL AS NEEDED
Status: DISCONTINUED | OUTPATIENT
Start: 2019-06-14 | End: 2019-06-14 | Stop reason: HOSPADM

## 2019-06-14 RX ORDER — SODIUM CHLORIDE, SODIUM LACTATE, POTASSIUM CHLORIDE, CALCIUM CHLORIDE 600; 310; 30; 20 MG/100ML; MG/100ML; MG/100ML; MG/100ML
INJECTION, SOLUTION INTRAVENOUS
Status: DISCONTINUED | OUTPATIENT
Start: 2019-06-14 | End: 2019-06-14 | Stop reason: HOSPADM

## 2019-06-14 RX ORDER — SODIUM CHLORIDE 0.9 % (FLUSH) 0.9 %
5-40 SYRINGE (ML) INJECTION EVERY 8 HOURS
Status: DISCONTINUED | OUTPATIENT
Start: 2019-06-14 | End: 2019-06-14 | Stop reason: HOSPADM

## 2019-06-14 RX ORDER — SUCCINYLCHOLINE CHLORIDE 20 MG/ML
INJECTION INTRAMUSCULAR; INTRAVENOUS AS NEEDED
Status: DISCONTINUED | OUTPATIENT
Start: 2019-06-14 | End: 2019-06-14 | Stop reason: HOSPADM

## 2019-06-14 RX ORDER — NALOXONE HYDROCHLORIDE 0.4 MG/ML
0.2 INJECTION, SOLUTION INTRAMUSCULAR; INTRAVENOUS; SUBCUTANEOUS
Status: DISCONTINUED | OUTPATIENT
Start: 2019-06-14 | End: 2019-06-14 | Stop reason: HOSPADM

## 2019-06-14 RX ORDER — SODIUM CHLORIDE 0.9 % (FLUSH) 0.9 %
5-40 SYRINGE (ML) INJECTION AS NEEDED
Status: DISCONTINUED | OUTPATIENT
Start: 2019-06-14 | End: 2019-06-14 | Stop reason: HOSPADM

## 2019-06-14 RX ADMIN — SODIUM CHLORIDE, SODIUM LACTATE, POTASSIUM CHLORIDE, CALCIUM CHLORIDE: 600; 310; 30; 20 INJECTION, SOLUTION INTRAVENOUS at 08:41

## 2019-06-14 RX ADMIN — SUCCINYLCHOLINE CHLORIDE 40 MG: 20 INJECTION INTRAMUSCULAR; INTRAVENOUS at 09:39

## 2019-06-14 NOTE — DISCHARGE INSTRUCTIONS
ECT DISCHARGE INSTRUCTIONS      NAME: Danae Ororuke   :  1940   MRN:  [id number]     Date/Time:  2019 9:43 AM    ADMIT DATE: 2019     DISCHARGE DATE: 2019     DISCHARGE DIAGNOSIS:  There are no discharge diagnoses documented for the most recent discharge. Recommended diet:  As tolerated. Recommended activity:      Have a responsible person stay with the patient for 24 hours after the treatment, some temporary confusion may be noticed. Do not allow a patient to operate a motor vehicle for at least 24 hours and all the confusion has cleared. Do not drink alcoholic beverages for 48 hours past treatment. Do not sign any legal documents. Do not make any critical decisions for 24 hours. Advance diet as tolerated. Start with liquids and advance it nausea is not present. Understand that memory for recent events, phone numbers, addresses, ect. May be decreased for a short period of time. Report any persistant nausea or pain to the treating physician. Patient receiving ECT while in the hospital should also not attempt to ambulate without assistance, please use tap bell which will be provide. If you experience any of the following symptoms then please call your primary care physician/outpatient psychiatrist or return to the emergency room if you cannot get hold of your doctor: Fever, chills, nausea, vomiting, diarrhea, change in mentation, falling, bleeding, shortness of breath. Please call the emergency room at [facility] [phone] in this case. Follow Up:  Continue outpatient psychiatric follow-up visits with your personal psychiatrist.       Information obtained by :  I understand that if any problems occur once I am at home I am to contact my physician. I understand and acknowledge receipt of the instructions indicated above. Physician's or R.N.'s Signature                                                             Date/Time                                                                                                                                              Patient or Representative Signature                                                     Date/Time      DISCHARGE SUMMARY from Nurse    PATIENT INSTRUCTIONS:    After general anesthesia or intravenous sedation, for 24 hours or while taking prescription Narcotics:  · Limit your activities  · Do not drive and operate hazardous machinery  · Do not make important personal or business decisions  · Do  not drink alcoholic beverages  · If you have not urinated within 8 hours after discharge, please contact your surgeon on call. Report the following to your surgeon:  · Excessive pain, swelling, redness or odor of or around the surgical area  · Temperature over 100.5  · Nausea and vomiting lasting longer than 4 hours or if unable to take medications  · Any signs of decreased circulation or nerve impairment to extremity: change in color, persistent  numbness, tingling, coldness or increase pain  · Any questions      *  Please give a list of your current medications to your Primary Care Provider. *  Please update this list whenever your medications are discontinued, doses are      changed, or new medications (including over-the-counter products) are added. *  Please carry medication information at all times in case of emergency situations. These are general instructions for a healthy lifestyle:    No smoking/ No tobacco products/ Avoid exposure to second hand smoke  Surgeon General's Warning:  Quitting smoking now greatly reduces serious risk to your health.     Obesity, smoking, and sedentary lifestyle greatly increases your risk for illness    A healthy diet, regular physical exercise & weight monitoring are important for maintaining a healthy lifestyle    You may be retaining fluid if you have a history of heart failure or if you experience any of the following symptoms:  Weight gain of 3 pounds or more overnight or 5 pounds in a week, increased swelling in our hands or feet or shortness of breath while lying flat in bed. Please call your doctor as soon as you notice any of these symptoms; do not wait until your next office visit. Recognize signs and symptoms of STROKE:    F-face looks uneven    A-arms unable to move or move unevenly    S-speech slurred or non-existent    T-time-call 911 as soon as signs and symptoms begin-DO NOT go       Back to bed or wait to see if you get better-TIME IS BRAIN. Warning Signs of HEART ATTACK     Call 911 if you have these symptoms:   Chest discomfort. Most heart attacks involve discomfort in the center of the chest that lasts more than a few minutes, or that goes away and comes back. It can feel like uncomfortable pressure, squeezing, fullness, or pain.  Discomfort in other areas of the upper body. Symptoms can include pain or discomfort in one or both arms, the back, neck, jaw, or stomach.  Shortness of breath with or without chest discomfort.  Other signs may include breaking out in a cold sweat, nausea, or lightheadedness. Don't wait more than five minutes to call 911 - MINUTES MATTER! Fast action can save your life. Calling 911 is almost always the fastest way to get lifesaving treatment. Emergency Medical Services staff can begin treatment when they arrive -- up to an hour sooner than if someone gets to the hospital by car. The discharge information has been reviewed with the patient and nephew. The patient and nephew verbalized understanding.   Discharge medications reviewed with the patient and nephew and appropriate educational materials and side effects teaching were provided.   ___________________________________________________________________________________________________________________________________

## 2019-06-14 NOTE — ANESTHESIA POSTPROCEDURE EVALUATION
Procedure(s):  ELECTRO CONVULSIVE THERAPY. general    Anesthesia Post Evaluation      Multimodal analgesia: multimodal analgesia used between 6 hours prior to anesthesia start to PACU discharge  Patient location during evaluation: bedside  Patient participation: complete - patient participated  Level of consciousness: awake  Pain management: adequate  Airway patency: patent  Anesthetic complications: no  Cardiovascular status: acceptable  Respiratory status: acceptable  Hydration status: acceptable        Vitals Value Taken Time   /60 6/14/2019 10:05 AM   Temp     Pulse 85 6/14/2019 10:14 AM   Resp 12 6/14/2019 10:14 AM   SpO2 94 % 6/14/2019 10:14 AM   Vitals shown include unvalidated device data.

## 2019-06-14 NOTE — PROCEDURES
ECT PROCEDURE NOTE      IDENTIFICATION:    Patient Name  Cici Carrera   Date of Birth 1940   [facility] [id number]   Medical Record Number  [id number]      Age  66 y.o. PCP Daya Kennedy MD   Admit date:  6/14/2019    Room Number  PACU/PL  @ [facility]   Date of Service  6/14/2019            Electro Convulsive Therapy:  Treatment number 25    Maintenance ECT yes Q4 weeks   Cici Carrera was admitted to the PACU for ECT. Patient was medically cleared and NPO for procedure. Patient gave informed consent for ECT treatment. Patient received     Bilateral ECT yes     at 40 % Energy, under general anesthesia. Cici Carrera with a good, well generalized seizure of  27 seconds on observation of the motor manifestations of the seizure. EEG duration was NA secs. Recovery was unremarkable and with no complications.      Change in Energy %:             Anesthesia medications administered during procedure:  Brevital:  60 mg  Change for next treament:     Succinylcholine: 40 mg  Change for next treatment:    Propofol: mg  Glycopyrrolate:  mg  Labetalol:  mg  Esmolol:  mg  Lorazepam:  mg  Ketamine:  mg  Zofran:   mg    Toradol:  mg  Lidocaine:  mg  Caffeine Benzoate: mg       Skin to be cleaned with Alcohol swab prior to procedure    SIGNED:    Bernard Romano MD  6/14/2019

## 2019-07-09 ENCOUNTER — ANESTHESIA EVENT (OUTPATIENT)
Dept: SURGERY | Age: 79
End: 2019-07-09
Payer: COMMERCIAL

## 2019-07-09 RX ORDER — SODIUM CHLORIDE 9 MG/ML
50 INJECTION, SOLUTION INTRAVENOUS CONTINUOUS
Status: CANCELLED | OUTPATIENT
Start: 2019-07-09 | End: 2019-07-10

## 2019-07-09 RX ORDER — SODIUM CHLORIDE 0.9 % (FLUSH) 0.9 %
5-40 SYRINGE (ML) INJECTION EVERY 8 HOURS
Status: CANCELLED | OUTPATIENT
Start: 2019-07-09

## 2019-07-09 RX ORDER — LIDOCAINE HYDROCHLORIDE 10 MG/ML
0.1 INJECTION, SOLUTION EPIDURAL; INFILTRATION; INTRACAUDAL; PERINEURAL AS NEEDED
Status: CANCELLED | OUTPATIENT
Start: 2019-07-09

## 2019-07-09 RX ORDER — SODIUM CHLORIDE, SODIUM LACTATE, POTASSIUM CHLORIDE, CALCIUM CHLORIDE 600; 310; 30; 20 MG/100ML; MG/100ML; MG/100ML; MG/100ML
50 INJECTION, SOLUTION INTRAVENOUS CONTINUOUS
Status: CANCELLED | OUTPATIENT
Start: 2019-07-09 | End: 2019-07-10

## 2019-07-09 RX ORDER — SODIUM CHLORIDE 0.9 % (FLUSH) 0.9 %
5-40 SYRINGE (ML) INJECTION AS NEEDED
Status: CANCELLED | OUTPATIENT
Start: 2019-07-09

## 2019-07-12 ENCOUNTER — ANESTHESIA (OUTPATIENT)
Dept: SURGERY | Age: 79
End: 2019-07-12
Payer: COMMERCIAL

## 2019-07-12 ENCOUNTER — HOSPITAL ENCOUNTER (OUTPATIENT)
Age: 79
Setting detail: OUTPATIENT SURGERY
Discharge: HOME | End: 2019-07-12
Attending: PSYCHIATRY & NEUROLOGY | Admitting: PSYCHIATRY & NEUROLOGY
Payer: COMMERCIAL

## 2019-07-12 VITALS
TEMPERATURE: 98.7 F | OXYGEN SATURATION: 95 % | SYSTOLIC BLOOD PRESSURE: 127 MMHG | BODY MASS INDEX: 27.3 KG/M2 | RESPIRATION RATE: 18 BRPM | HEIGHT: 63 IN | WEIGHT: 154.1 LBS | DIASTOLIC BLOOD PRESSURE: 57 MMHG | HEART RATE: 83 BPM

## 2019-07-12 LAB
ANION GAP SERPL CALC-SCNC: 7 MMOL/L (ref 5–15)
BUN SERPL-MCNC: 29 MG/DL (ref 6–20)
BUN/CREAT SERPL: 26 (ref 12–20)
CALCIUM SERPL-MCNC: 9.5 MG/DL (ref 8.5–10.1)
CHLORIDE SERPL-SCNC: 108 MMOL/L (ref 97–108)
CO2 SERPL-SCNC: 31 MMOL/L (ref 21–32)
CREAT SERPL-MCNC: 1.1 MG/DL (ref 0.55–1.02)
ERYTHROCYTE [DISTWIDTH] IN BLOOD BY AUTOMATED COUNT: 13.6 % (ref 11.5–14.5)
GLUCOSE SERPL-MCNC: 106 MG/DL (ref 65–100)
HCT VFR BLD AUTO: 39.3 % (ref 35–47)
HGB BLD-MCNC: 12.5 G/DL (ref 11.5–16)
MCH RBC QN AUTO: 30.2 PG (ref 26–34)
MCHC RBC AUTO-ENTMCNC: 31.8 G/DL (ref 30–36.5)
MCV RBC AUTO: 94.9 FL (ref 80–99)
NRBC # BLD: 0 K/UL (ref 0–0.01)
NRBC BLD-RTO: 0 PER 100 WBC
PLATELET # BLD AUTO: 203 K/UL (ref 150–400)
PMV BLD AUTO: 9.8 FL (ref 8.9–12.9)
POTASSIUM SERPL-SCNC: 3.7 MMOL/L (ref 3.5–5.1)
RBC # BLD AUTO: 4.14 M/UL (ref 3.8–5.2)
SODIUM SERPL-SCNC: 146 MMOL/L (ref 136–145)
WBC # BLD AUTO: 6.3 K/UL (ref 3.6–11)

## 2019-07-12 PROCEDURE — 74011250636 HC RX REV CODE- 250/636

## 2019-07-12 PROCEDURE — 74011000250 HC RX REV CODE- 250

## 2019-07-12 PROCEDURE — 80048 BASIC METABOLIC PNL TOTAL CA: CPT

## 2019-07-12 PROCEDURE — 90870 ELECTROCONVULSIVE THERAPY: CPT | Performed by: PSYCHIATRY & NEUROLOGY

## 2019-07-12 PROCEDURE — 74780000002 HC ELECTROSHOCK THERAP RECOVERY: Performed by: PSYCHIATRY & NEUROLOGY

## 2019-07-12 PROCEDURE — 36415 COLL VENOUS BLD VENIPUNCTURE: CPT

## 2019-07-12 PROCEDURE — 85027 COMPLETE CBC AUTOMATED: CPT

## 2019-07-12 RX ORDER — SODIUM CHLORIDE 0.9 % (FLUSH) 0.9 %
5-40 SYRINGE (ML) INJECTION AS NEEDED
Status: DISCONTINUED | OUTPATIENT
Start: 2019-07-12 | End: 2019-07-12 | Stop reason: HOSPADM

## 2019-07-12 RX ORDER — SUCCINYLCHOLINE CHLORIDE 20 MG/ML
INJECTION INTRAMUSCULAR; INTRAVENOUS AS NEEDED
Status: DISCONTINUED | OUTPATIENT
Start: 2019-07-12 | End: 2019-07-12 | Stop reason: HOSPADM

## 2019-07-12 RX ORDER — HYDROMORPHONE HYDROCHLORIDE 1 MG/ML
0.5 INJECTION, SOLUTION INTRAMUSCULAR; INTRAVENOUS; SUBCUTANEOUS
Status: DISCONTINUED | OUTPATIENT
Start: 2019-07-12 | End: 2019-07-12 | Stop reason: HOSPADM

## 2019-07-12 RX ORDER — SODIUM CHLORIDE 0.9 % (FLUSH) 0.9 %
5-40 SYRINGE (ML) INJECTION AS NEEDED
Status: CANCELLED | OUTPATIENT
Start: 2019-07-12

## 2019-07-12 RX ORDER — SODIUM CHLORIDE 0.9 % (FLUSH) 0.9 %
5-40 SYRINGE (ML) INJECTION EVERY 8 HOURS
Status: CANCELLED | OUTPATIENT
Start: 2019-07-12

## 2019-07-12 RX ORDER — METHOHEXITAL IN WATER/PF 100MG/10ML
SYRINGE (ML) INTRAVENOUS AS NEEDED
Status: DISCONTINUED | OUTPATIENT
Start: 2019-07-12 | End: 2019-07-12 | Stop reason: HOSPADM

## 2019-07-12 RX ORDER — FENTANYL CITRATE 50 UG/ML
25 INJECTION, SOLUTION INTRAMUSCULAR; INTRAVENOUS
Status: DISCONTINUED | OUTPATIENT
Start: 2019-07-12 | End: 2019-07-12 | Stop reason: HOSPADM

## 2019-07-12 RX ORDER — SODIUM CHLORIDE 0.9 % (FLUSH) 0.9 %
5-40 SYRINGE (ML) INJECTION EVERY 8 HOURS
Status: DISCONTINUED | OUTPATIENT
Start: 2019-07-12 | End: 2019-07-12 | Stop reason: HOSPADM

## 2019-07-12 RX ADMIN — SUCCINYLCHOLINE CHLORIDE 40 MG: 20 INJECTION INTRAMUSCULAR; INTRAVENOUS at 09:30

## 2019-07-12 RX ADMIN — Medication 60 MG: at 09:30

## 2019-07-12 NOTE — PERIOP NOTES
Mercy Cortes  1940  [id number]    Situation:  Verbal report given from: RN [facility] and Dr. Sun Rich  Procedure: Procedure(s):  ELECTRO CONVULSIVE THERAPY    Background:    Preoperative diagnosis: DEPRESSION    Postoperative diagnosis: DEPRESSION    :  Dr. Jimy Quiroga    Assistant(s): Circ-[address]: [id number] Staff: Reza Kuhn RN    Specimens: * No specimens in log *    Assessment:  Intra-procedure medications         Anesthesia gave intra-procedure sedation and medications, see anesthesia flow sheet     Intravenous fluids: LR@ KVO     Vital signs stable       Recommendation:    Permission to share finding with Yeny Berg.

## 2019-07-12 NOTE — PERIOP NOTES
Pt's nephew Bryson Pinon has power of  and is able to receive all medical information and has signed for her instructions.

## 2019-07-12 NOTE — ANESTHESIA POSTPROCEDURE EVALUATION
Procedure(s):  ELECTRO CONVULSIVE THERAPY. general    Anesthesia Post Evaluation      Multimodal analgesia: multimodal analgesia not used between 6 hours prior to anesthesia start to PACU discharge  Patient location during evaluation: PACU  Patient participation: complete - patient participated  Level of consciousness: awake and alert  Pain management: adequate  Airway patency: patent  Anesthetic complications: no  Cardiovascular status: acceptable  Respiratory status: acceptable  Hydration status: acceptable  Post anesthesia nausea and vomiting:  none      Vitals Value Taken Time   /57 7/12/2019 10:05 AM   Temp 37.1 °C (98.7 °F) 7/12/2019  9:42 AM   Pulse 82 7/12/2019 10:11 AM   Resp 16 7/12/2019 10:11 AM   SpO2 95 % 7/12/2019 10:11 AM   Vitals shown include unvalidated device data.

## 2019-07-12 NOTE — DISCHARGE INSTRUCTIONS
ECT DISCHARGE INSTRUCTIONS      NAME: Jaja Lopez   :  1940   MRN:  [id number]     Date/Time:  2019 9:26 AM    ADMIT DATE: 2019     DISCHARGE DATE: 2019     DISCHARGE DIAGNOSIS:  There are no discharge diagnoses documented for the most recent discharge. Recommended diet:  {diet:76120}    Recommended activity: {discharge activity:84968}    Have a responsible person stay with the patient for 24 hours after the treatment, some temporary confusion may be noticed. Do not allow a patient to operate a motor vehicle for at least 24 hours and all the confusion has cleared. Do not drink alcoholic beverages for 48 hours past treatment. Do not sign any legal documents. Do not make any critical decisions for 24 hours. Advance diet as tolerated. Start with liquids and advance it nausea is not present. Understand that memory for recent events, phone numbers, addresses, ect. May be decreased for a short period of time. Report any persistant nausea or pain to the treating physician. Patient receiving ECT while in the hospital should also not attempt to ambulate without assistance, please use tap bell which will be provide. If you have questions regarding the hospital related prescriptions or hospital related issues please call [address] [place] [address] at [phone]. If you experience any of the following symptoms then please call your primary care physician/outpatient psychiatrist or return to the emergency room if you cannot get hold of your doctor: Fever, chills, nausea, vomiting, diarrhea, change in mentation, falling, bleeding, shortness of breath. Follow Up:  Continue outpatient psychiatric follow-up visits with personal psychiatrist.  Return for next ECT treatment in ***        Information obtained by :  I understand that if any problems occur once I am at home I am to contact my physician.     I understand and acknowledge receipt of the instructions indicated above. Physician's or R.N.'s Signature                                                             Date/Time                                                                                                                                              Patient or Representative Signature                                                     Date/Time      DISCHARGE SUMMARY from Nurse    PATIENT INSTRUCTIONS:    After general anesthesia or intravenous sedation, for 24 hours or while taking prescription Narcotics:  · Limit your activities  · Do not drive and operate hazardous machinery  · Do not make important personal or business decisions  · Do  not drink alcoholic beverages  · If you have not urinated within 8 hours after discharge, please contact your surgeon on call. Report the following to your surgeon:  · Excessive pain, swelling, redness or odor of or around the surgical area  · Temperature over 100.5  · Nausea and vomiting lasting longer than 4 hours or if unable to take medications  · Any signs of decreased circulation or nerve impairment to extremity: change in color, persistent  numbness, tingling, coldness or increase pain  · Any questions      *  Please give a list of your current medications to your Primary Care Provider. *  Please update this list whenever your medications are discontinued, doses are      changed, or new medications (including over-the-counter products) are added. *  Please carry medication information at all times in case of emergency situations. These are general instructions for a healthy lifestyle:    No smoking/ No tobacco products/ Avoid exposure to second hand smoke  Surgeon General's Warning:  Quitting smoking now greatly reduces serious risk to your health.     Obesity, smoking, and sedentary lifestyle greatly increases your risk for illness    A healthy diet, regular physical exercise & weight monitoring are important for maintaining a healthy lifestyle    You may be retaining fluid if you have a history of heart failure or if you experience any of the following symptoms:  Weight gain of 3 pounds or more overnight or 5 pounds in a week, increased swelling in our hands or feet or shortness of breath while lying flat in bed. Please call your doctor as soon as you notice any of these symptoms; do not wait until your next office visit. The discharge information has been reviewed with the patient and nephew. The patient and nephew verbalized understanding. Discharge medications reviewed with the patient and nephew and appropriate educational materials and side effects teaching were provided.   ___________________________________________________________________________________________________________________________________

## 2019-07-12 NOTE — PROCEDURES
ECT PROCEDURE NOTE      IDENTIFICATION:    Patient Name  Jessica Cruz   Date of Birth 1940   [facility] [id number]   Medical Record Number  [id number]      Age  66 y.o. PCP Rafaela Chris MD   Admit date:  7/12/2019    Room Number  OR/PL  @ [facility]   Date of Service  7/12/2019            Electro Convulsive Therapy:  Treatment number 26    Maintenance ECT yes Q4 weeks   Jessica Cruz was admitted to the PACU for ECT. Patient was medically cleared and NPO for procedure. Patient gave informed consent for ECT treatment. Patient received     Bilateral ECT yes     at 40 % Energy, under general anesthesia. Jessica Cruz with a good, well generalized seizure of  29 seconds on observation of the motor manifestations of the seizure. EEG duration was NA secs. Recovery was unremarkable and with no complications.      Change in Energy %:             Anesthesia medications administered during procedure:  Brevital:  60 mg  Change for next treament:     Succinylcholine: 40 mg  Change for next treatment:    Propofol: mg  Glycopyrrolate:  mg  Labetalol:  mg  Esmolol:  mg  Lorazepam:  mg  Ketamine:  mg  Zofran:   mg    Toradol:  mg  Lidocaine:  mg  Caffeine Benzoate: mg       Skin to be cleaned with Alcohol swab prior to procedure    SIGNED:    Alicia Gonzalez MD  7/12/2019

## 2019-08-09 ENCOUNTER — ANESTHESIA EVENT (OUTPATIENT)
Dept: SURGERY | Age: 79
End: 2019-08-09
Payer: COMMERCIAL

## 2019-08-16 ENCOUNTER — HOSPITAL ENCOUNTER (OUTPATIENT)
Age: 79
Setting detail: OUTPATIENT SURGERY
Discharge: HOME | End: 2019-08-16
Attending: PSYCHIATRY & NEUROLOGY | Admitting: PSYCHIATRY & NEUROLOGY
Payer: COMMERCIAL

## 2019-08-16 ENCOUNTER — ANESTHESIA (OUTPATIENT)
Dept: SURGERY | Age: 79
End: 2019-08-16
Payer: COMMERCIAL

## 2019-08-16 VITALS
OXYGEN SATURATION: 96 % | HEIGHT: 63 IN | DIASTOLIC BLOOD PRESSURE: 55 MMHG | RESPIRATION RATE: 12 BRPM | BODY MASS INDEX: 27.3 KG/M2 | SYSTOLIC BLOOD PRESSURE: 131 MMHG | HEART RATE: 79 BPM | WEIGHT: 154.1 LBS | TEMPERATURE: 98.5 F

## 2019-08-16 PROCEDURE — 74780000002 HC ELECTROSHOCK THERAP RECOVERY: Performed by: PSYCHIATRY & NEUROLOGY

## 2019-08-16 PROCEDURE — 74011250636 HC RX REV CODE- 250/636: Performed by: ANESTHESIOLOGY

## 2019-08-16 PROCEDURE — 90870 ELECTROCONVULSIVE THERAPY: CPT | Performed by: PSYCHIATRY & NEUROLOGY

## 2019-08-16 PROCEDURE — 74011000250 HC RX REV CODE- 250: Performed by: ANESTHESIOLOGY

## 2019-08-16 RX ORDER — SUCCINYLCHOLINE CHLORIDE 20 MG/ML
INJECTION INTRAMUSCULAR; INTRAVENOUS AS NEEDED
Status: DISCONTINUED | OUTPATIENT
Start: 2019-08-16 | End: 2019-08-16 | Stop reason: HOSPADM

## 2019-08-16 RX ORDER — SODIUM CHLORIDE 0.9 % (FLUSH) 0.9 %
5-40 SYRINGE (ML) INJECTION EVERY 8 HOURS
Status: DISCONTINUED | OUTPATIENT
Start: 2019-08-16 | End: 2019-08-16 | Stop reason: HOSPADM

## 2019-08-16 RX ORDER — HYDROMORPHONE HYDROCHLORIDE 1 MG/ML
0.5 INJECTION, SOLUTION INTRAMUSCULAR; INTRAVENOUS; SUBCUTANEOUS
Status: DISCONTINUED | OUTPATIENT
Start: 2019-08-16 | End: 2019-08-16 | Stop reason: HOSPADM

## 2019-08-16 RX ORDER — FENTANYL CITRATE 50 UG/ML
25 INJECTION, SOLUTION INTRAMUSCULAR; INTRAVENOUS
Status: DISCONTINUED | OUTPATIENT
Start: 2019-08-16 | End: 2019-08-16 | Stop reason: HOSPADM

## 2019-08-16 RX ORDER — SODIUM CHLORIDE 0.9 % (FLUSH) 0.9 %
5-40 SYRINGE (ML) INJECTION AS NEEDED
Status: DISCONTINUED | OUTPATIENT
Start: 2019-08-16 | End: 2019-08-16 | Stop reason: HOSPADM

## 2019-08-16 RX ORDER — SODIUM CHLORIDE 9 MG/ML
50 INJECTION, SOLUTION INTRAVENOUS CONTINUOUS
Status: DISCONTINUED | OUTPATIENT
Start: 2019-08-16 | End: 2019-08-16 | Stop reason: HOSPADM

## 2019-08-16 RX ORDER — LIDOCAINE HYDROCHLORIDE 10 MG/ML
0.1 INJECTION, SOLUTION EPIDURAL; INFILTRATION; INTRACAUDAL; PERINEURAL AS NEEDED
Status: DISCONTINUED | OUTPATIENT
Start: 2019-08-16 | End: 2019-08-16 | Stop reason: HOSPADM

## 2019-08-16 RX ORDER — HYDROMORPHONE HYDROCHLORIDE 1 MG/ML
.25-1 INJECTION, SOLUTION INTRAMUSCULAR; INTRAVENOUS; SUBCUTANEOUS
Status: DISCONTINUED | OUTPATIENT
Start: 2019-08-16 | End: 2019-08-16 | Stop reason: SDUPTHER

## 2019-08-16 RX ORDER — METHOHEXITAL IN WATER/PF 100MG/10ML
SYRINGE (ML) INTRAVENOUS AS NEEDED
Status: DISCONTINUED | OUTPATIENT
Start: 2019-08-16 | End: 2019-08-16 | Stop reason: HOSPADM

## 2019-08-16 RX ORDER — SODIUM CHLORIDE, SODIUM LACTATE, POTASSIUM CHLORIDE, CALCIUM CHLORIDE 600; 310; 30; 20 MG/100ML; MG/100ML; MG/100ML; MG/100ML
125 INJECTION, SOLUTION INTRAVENOUS CONTINUOUS
Status: DISCONTINUED | OUTPATIENT
Start: 2019-08-16 | End: 2019-08-16 | Stop reason: HOSPADM

## 2019-08-16 RX ORDER — SODIUM CHLORIDE, SODIUM LACTATE, POTASSIUM CHLORIDE, CALCIUM CHLORIDE 600; 310; 30; 20 MG/100ML; MG/100ML; MG/100ML; MG/100ML
50 INJECTION, SOLUTION INTRAVENOUS CONTINUOUS
Status: DISCONTINUED | OUTPATIENT
Start: 2019-08-16 | End: 2019-08-16 | Stop reason: HOSPADM

## 2019-08-16 RX ORDER — SODIUM CHLORIDE, SODIUM LACTATE, POTASSIUM CHLORIDE, CALCIUM CHLORIDE 600; 310; 30; 20 MG/100ML; MG/100ML; MG/100ML; MG/100ML
75 INJECTION, SOLUTION INTRAVENOUS CONTINUOUS
Status: DISCONTINUED | OUTPATIENT
Start: 2019-08-16 | End: 2019-08-16 | Stop reason: HOSPADM

## 2019-08-16 RX ADMIN — Medication 60 MG: at 09:22

## 2019-08-16 RX ADMIN — SUCCINYLCHOLINE CHLORIDE 40 MG: 20 INJECTION, SOLUTION INTRAMUSCULAR; INTRAVENOUS at 09:22

## 2019-08-16 NOTE — DISCHARGE INSTRUCTIONS
ECT DISCHARGE INSTRUCTIONS      NAME: Demian Rocha   :  1940   MRN:  [id number]     Date/Time:  2019 9:26 AM    ADMIT DATE: 2019     DISCHARGE DATE: 2019     DISCHARGE DIAGNOSIS:  There are no discharge diagnoses documented for the most recent discharge. Recommended diet:  As tolerated. Recommended activity:      Have a responsible person stay with the patient for 24 hours after the treatment, some temporary confusion may be noticed. Do not allow a patient to operate a motor vehicle for at least 24 hours and all the confusion has cleared. Do not drink alcoholic beverages for 48 hours past treatment. Do not sign any legal documents. Do not make any critical decisions for 24 hours. Advance diet as tolerated. Start with liquids and advance it nausea is not present. Understand that memory for recent events, phone numbers, addresses, ect. May be decreased for a short period of time. Report any persistant nausea or pain to the treating physician. Patient receiving ECT while in the hospital should also not attempt to ambulate without assistance, please use tap bell which will be provide. If you experience any of the following symptoms then please call your primary care physician/outpatient psychiatrist or return to the emergency room if you cannot get hold of your doctor: Fever, chills, nausea, vomiting, diarrhea, change in mentation, falling, bleeding, shortness of breath. Please call the emergency room at [facility] [phone] in this case. Follow Up:  Continue outpatient psychiatric follow-up visits with your personal psychiatrist.       Information obtained by :  I understand that if any problems occur once I am at home I am to contact my physician. I understand and acknowledge receipt of the instructions indicated above. Physician's or R.N.'s Signature                                                             Date/Time                                                                                                                                              Patient or Representative Signature                                                     Date/Time

## 2019-08-16 NOTE — PERIOP NOTES
Discharge instructions explained to patient and nephew, pt taken via wheelchair out of PACU, pt stable for discharge

## 2019-08-16 NOTE — ANESTHESIA POSTPROCEDURE EVALUATION
Procedure(s):  ELECTRO CONVULSIVE THERAPY. general    Anesthesia Post Evaluation      Multimodal analgesia: multimodal analgesia not used between 6 hours prior to anesthesia start to PACU discharge  Patient location during evaluation: PACU  Patient participation: complete - patient participated  Level of consciousness: awake and alert  Pain score: 0  Pain management: satisfactory to patient  Airway patency: patent  Anesthetic complications: no  Cardiovascular status: acceptable  Respiratory status: acceptable  Hydration status: acceptable  Post anesthesia nausea and vomiting:  none      Vitals Value Taken Time   /60 8/16/2019  9:35 AM   Temp     Pulse 81 8/16/2019  9:37 AM   Resp 14 8/16/2019  9:37 AM   SpO2 98 % 8/16/2019  9:37 AM   Vitals shown include unvalidated device data.

## 2019-08-16 NOTE — ANESTHESIA PREPROCEDURE EVALUATION
Relevant Problems   No relevant active problems       Anesthetic History   No history of anesthetic complications            Review of Systems / Medical History  Patient summary reviewed and pertinent labs reviewed    Pulmonary  Within defined limits                 Neuro/Psych         Psychiatric history     Cardiovascular    Hypertension: well controlled                   GI/Hepatic/Renal  Within defined limits              Endo/Other  Within defined limits           Other Findings              Physical Exam    Airway  Mallampati: II  TM Distance: 4 - 6 cm  Neck ROM: normal range of motion   Mouth opening: Normal     Cardiovascular  Regular rate and rhythm,  S1 and S2 normal,  no murmur, click, rub, or gallop  Rhythm: regular  Rate: normal         Dental    Dentition: Poor dentition     Pulmonary  Breath sounds clear to auscultation               Abdominal  GI exam deferred       Other Findings            Anesthetic Plan    ASA: 3  Anesthesia type: general          Induction: Intravenous  Anesthetic plan and risks discussed with: Patient

## 2019-08-16 NOTE — PROCEDURES
Electro Convulsive Therapy:  Treatment number 27     Maintenance ECT yes Q4 weeks   Nery Guevara was admitted to the PACU for ECT. Patient was medically cleared and NPO for procedure. Patient gave informed consent for ECT treatment.       Patient received      Bilateral ECT yes      at 40 % Energy, under general anesthesia. Nery Guevara with a good, well generalized seizure of 30 seconds on observation of the motor manifestations of the seizure. EEG duration was 33 secs. Recovery was unremarkable and with no complications.      Change in Energy %:                Anesthesia medications administered during procedure:  Brevital:  60 mg  Change for next treament:      Succinylcholine: 40 mg  Change for next treatment:     Propofol: mg  Glycopyrrolate:  mg  Labetalol:  mg  Esmolol:  mg  Lorazepam:  mg  Ketamine:  mg  Zofran:   mg    Toradol:  mg  Lidocaine:  mg  Caffeine Benzoate: mg             CSSRS 8/16/2019   In the past month, have you wished you were dead or wished you could go to sleep and not wake up? No   In the past month, have you had any actual thoughts of killing yourself? No   In your lifetime, have you ever done anything, started to do anything, or prepared to do anything to end your life? Yes   Was this within the past 3 months?  No       Skin to be cleaned with Alcohol swab prior to procedure

## 2019-08-16 NOTE — PERIOP NOTES
Handoff Report from Operating Room to PACU    Report received from Alycia Mujica RN and Dr. Rohini Diane regarding Rolena Captain. Surgeon(s):  Saundra Ghotra MD  And Procedure(s) (LRB):  ELECTRO CONVULSIVE THERAPY (N/A)  confirmed   with allergies discussed. Anesthesia type, drugs, patient history, complications, estimated blood loss, vital signs, intake and output, and last pain medication, lines and temperature were reviewed.

## 2019-09-05 ENCOUNTER — ANESTHESIA EVENT (OUTPATIENT)
Dept: SURGERY | Age: 79
End: 2019-09-05
Payer: MEDICARE

## 2019-09-11 ENCOUNTER — ANESTHESIA (OUTPATIENT)
Dept: SURGERY | Age: 79
End: 2019-09-11
Payer: MEDICARE

## 2019-09-11 ENCOUNTER — HOSPITAL ENCOUNTER (OUTPATIENT)
Age: 79
Setting detail: OUTPATIENT SURGERY
Discharge: HOME | End: 2019-09-11
Attending: PSYCHIATRY & NEUROLOGY | Admitting: PSYCHIATRY & NEUROLOGY
Payer: MEDICARE

## 2019-09-11 VITALS
TEMPERATURE: 97.4 F | HEART RATE: 73 BPM | RESPIRATION RATE: 15 BRPM | SYSTOLIC BLOOD PRESSURE: 123 MMHG | OXYGEN SATURATION: 96 % | DIASTOLIC BLOOD PRESSURE: 45 MMHG

## 2019-09-11 PROCEDURE — 74011250636 HC RX REV CODE- 250/636: Performed by: ANESTHESIOLOGY

## 2019-09-11 PROCEDURE — 74780000002 HC ELECTROSHOCK THERAP RECOVERY: Performed by: PSYCHIATRY & NEUROLOGY

## 2019-09-11 PROCEDURE — 74011000250 HC RX REV CODE- 250: Performed by: ANESTHESIOLOGY

## 2019-09-11 PROCEDURE — 90870 ELECTROCONVULSIVE THERAPY: CPT | Performed by: PSYCHIATRY & NEUROLOGY

## 2019-09-11 RX ORDER — SUCCINYLCHOLINE CHLORIDE 20 MG/ML
INJECTION INTRAMUSCULAR; INTRAVENOUS AS NEEDED
Status: DISCONTINUED | OUTPATIENT
Start: 2019-09-11 | End: 2019-09-11 | Stop reason: HOSPADM

## 2019-09-11 RX ORDER — METHOHEXITAL IN WATER/PF 100MG/10ML
SYRINGE (ML) INTRAVENOUS AS NEEDED
Status: DISCONTINUED | OUTPATIENT
Start: 2019-09-11 | End: 2019-09-11 | Stop reason: HOSPADM

## 2019-09-11 RX ORDER — SODIUM CHLORIDE 0.9 % (FLUSH) 0.9 %
5-40 SYRINGE (ML) INJECTION AS NEEDED
Status: DISCONTINUED | OUTPATIENT
Start: 2019-09-11 | End: 2019-09-11 | Stop reason: HOSPADM

## 2019-09-11 RX ORDER — SODIUM CHLORIDE, SODIUM LACTATE, POTASSIUM CHLORIDE, CALCIUM CHLORIDE 600; 310; 30; 20 MG/100ML; MG/100ML; MG/100ML; MG/100ML
50 INJECTION, SOLUTION INTRAVENOUS CONTINUOUS
Status: DISCONTINUED | OUTPATIENT
Start: 2019-09-11 | End: 2019-09-11 | Stop reason: HOSPADM

## 2019-09-11 RX ORDER — SODIUM CHLORIDE 0.9 % (FLUSH) 0.9 %
5-40 SYRINGE (ML) INJECTION EVERY 8 HOURS
Status: DISCONTINUED | OUTPATIENT
Start: 2019-09-11 | End: 2019-09-11 | Stop reason: HOSPADM

## 2019-09-11 RX ORDER — SODIUM CHLORIDE 9 MG/ML
50 INJECTION, SOLUTION INTRAVENOUS CONTINUOUS
Status: DISCONTINUED | OUTPATIENT
Start: 2019-09-11 | End: 2019-09-11 | Stop reason: HOSPADM

## 2019-09-11 RX ORDER — LIDOCAINE HYDROCHLORIDE 10 MG/ML
0.1 INJECTION, SOLUTION EPIDURAL; INFILTRATION; INTRACAUDAL; PERINEURAL AS NEEDED
Status: DISCONTINUED | OUTPATIENT
Start: 2019-09-11 | End: 2019-09-11 | Stop reason: HOSPADM

## 2019-09-11 RX ORDER — HYDROMORPHONE HYDROCHLORIDE 1 MG/ML
.25-1 INJECTION, SOLUTION INTRAMUSCULAR; INTRAVENOUS; SUBCUTANEOUS
Status: DISCONTINUED | OUTPATIENT
Start: 2019-09-11 | End: 2019-09-11 | Stop reason: HOSPADM

## 2019-09-11 RX ORDER — SODIUM CHLORIDE, SODIUM LACTATE, POTASSIUM CHLORIDE, CALCIUM CHLORIDE 600; 310; 30; 20 MG/100ML; MG/100ML; MG/100ML; MG/100ML
125 INJECTION, SOLUTION INTRAVENOUS CONTINUOUS
Status: DISCONTINUED | OUTPATIENT
Start: 2019-09-11 | End: 2019-09-11 | Stop reason: HOSPADM

## 2019-09-11 RX ORDER — HYDROMORPHONE HYDROCHLORIDE 1 MG/ML
0.5 INJECTION, SOLUTION INTRAMUSCULAR; INTRAVENOUS; SUBCUTANEOUS
Status: DISCONTINUED | OUTPATIENT
Start: 2019-09-11 | End: 2019-09-11 | Stop reason: HOSPADM

## 2019-09-11 RX ORDER — SODIUM CHLORIDE, SODIUM LACTATE, POTASSIUM CHLORIDE, CALCIUM CHLORIDE 600; 310; 30; 20 MG/100ML; MG/100ML; MG/100ML; MG/100ML
75 INJECTION, SOLUTION INTRAVENOUS CONTINUOUS
Status: DISCONTINUED | OUTPATIENT
Start: 2019-09-11 | End: 2019-09-11 | Stop reason: HOSPADM

## 2019-09-11 RX ADMIN — Medication 60 MG: at 09:02

## 2019-09-11 RX ADMIN — SUCCINYLCHOLINE CHLORIDE 40 MG: 20 INJECTION, SOLUTION INTRAMUSCULAR; INTRAVENOUS at 09:02

## 2019-09-11 NOTE — PERIOP NOTES
Patient discharged to assisted living via her nephew. Discharge teaching conducted with the patient's nephew and included follow up appointments, diet and activity restrictions, and when to seek medical attention. No new medications were prescribed at this time. Written discharge instructions were provided as well.  The patient's nephew verbalized understanding of all discharge teaching provided and had no further questions or concerns that were expressed at the time of departure from the hospital.

## 2019-09-11 NOTE — PERIOP NOTES
Handoff Report from Operating Room to PACU    Report received from JJ Ro and HO Singer MD regarding Robson Olsen. Surgeon(s):  Alex Rivera MD  And Procedure(s) (LRB):  ELECTRO CONVULSIVE THERAPY (N/A)  confirmed with allergies discussed. Anesthesia type, drugs, patient history, complications, estimated blood loss, vital signs, intake and output, and lines and temperature were reviewed.

## 2019-09-11 NOTE — ANESTHESIA POSTPROCEDURE EVALUATION
Procedure(s):  ELECTRO CONVULSIVE THERAPY. general    Anesthesia Post Evaluation      Multimodal analgesia: multimodal analgesia not used between 6 hours prior to anesthesia start to PACU discharge  Patient location during evaluation: PACU  Patient participation: complete - patient participated  Level of consciousness: awake and alert  Pain score: 0  Pain management: satisfactory to patient  Airway patency: patent  Anesthetic complications: no  Cardiovascular status: acceptable  Respiratory status: acceptable  Hydration status: acceptable  Post anesthesia nausea and vomiting:  none      Vitals Value Taken Time   /54 9/11/2019  9:30 AM   Temp 36.5 °C (97.7 °F) 9/11/2019  9:10 AM   Pulse 72 9/11/2019  9:41 AM   Resp 7 9/11/2019  9:41 AM   SpO2 95 % 9/11/2019  9:41 AM   Vitals shown include unvalidated device data.

## 2019-09-11 NOTE — PROCEDURES
Electro Convulsive Therapy:  Treatment number 28     Maintenance ECT yes Q4 weeks   Tez Pena was admitted to the PACU for ECT. Patient was medically cleared and NPO for procedure. Patient gave informed consent for ECT treatment.       Patient received       Bilateral ECT yes      at 40 % Energy, under general anesthesia. Tez Pena with a good, well generalized seizure of 29 seconds on observation of the motor manifestations of the seizure. EEG duration was 22 secs. Recovery was unremarkable and with no complications.      Change in Energy %:                Anesthesia medications administered during procedure:  Brevital:  60 mg  Change for next treament:      Succinylcholine: 40 mg  Change for next treatment:     Propofol: mg  Glycopyrrolate:  mg  Labetalol:  mg  Esmolol:  mg  Lorazepam:  mg  Ketamine:  mg  Zofran:   mg    Toradol:  mg  Lidocaine:  mg  Caffeine Benzoate: mg             CSSRS 9/11/2019 8/16/2019   In the past month, have you wished you were dead or wished you could go to sleep and not wake up? No No   In the past month, have you had any actual thoughts of killing yourself? No No   In your lifetime, have you ever done anything, started to do anything, or prepared to do anything to end your life?  No Yes   Was this within the past 3 months? - No       Skin to be cleaned with Alcohol swab prior to procedure

## 2019-09-11 NOTE — DISCHARGE INSTRUCTIONS
ECT DISCHARGE INSTRUCTIONS      NAME: Shanta Oswald   :  1940   MRN:  [id number]     Date/Time:  2019 9:04 AM    ADMIT DATE: 2019     DISCHARGE DATE: 2019     DISCHARGE DIAGNOSIS:  There are no discharge diagnoses documented for the most recent discharge. Recommended diet:  As tolerated. Recommended activity:      Have a responsible person stay with the patient for 24 hours after the treatment, some temporary confusion may be noticed. Do not allow a patient to operate a motor vehicle for at least 24 hours and all the confusion has cleared. Do not drink alcoholic beverages for 48 hours past treatment. Do not sign any legal documents. Do not make any critical decisions for 24 hours. Advance diet as tolerated. Start with liquids and advance it nausea is not present. Understand that memory for recent events, phone numbers, addresses, ect. May be decreased for a short period of time. Report any persistant nausea or pain to the treating physician. Patient receiving ECT while in the hospital should also not attempt to ambulate without assistance, please use tap bell which will be provide. If you experience any of the following symptoms then please call your primary care physician/outpatient psychiatrist or return to the emergency room if you cannot get hold of your doctor: Fever, chills, nausea, vomiting, diarrhea, change in mentation, falling, bleeding, shortness of breath. Please call the emergency room at [facility] [phone] in this case. Follow Up:  Continue outpatient psychiatric follow-up visits with your personal psychiatrist.       Information obtained by :  I understand that if any problems occur once I am at home I am to contact my physician. I understand and acknowledge receipt of the instructions indicated above. Physician's or R.N.'s Signature                                                             Date/Time                                                                                                                                              Patient or Representative Signature                                                     Date/Time        DISCHARGE SUMMARY from Nurse    PATIENT INSTRUCTIONS:    After general anesthesia or intravenous sedation, for 24 hours or while taking prescription Narcotics:  · Limit your activities  · Do not drive and operate hazardous machinery  · Do not make important personal or business decisions  · Do  not drink alcoholic beverages  · If you have not urinated within 8 hours after discharge, please contact your surgeon on call. Report the following to your surgeon:  · Excessive pain, swelling, redness or odor of or around the surgical area  · Temperature over 100.5  · Nausea and vomiting lasting longer than 4 hours or if unable to take medications  · Any signs of decreased circulation or nerve impairment to extremity: change in color, persistent  numbness, tingling, coldness or increase pain  · Any questions    These are general instructions for a healthy lifestyle:    No smoking/ No tobacco products/ Avoid exposure to second hand smoke  Surgeon General's Warning:  Quitting smoking now greatly reduces serious risk to your health. Obesity, smoking, and sedentary lifestyle greatly increases your risk for illness    A healthy diet, regular physical exercise & weight monitoring are important for maintaining a healthy lifestyle    You may be retaining fluid if you have a history of heart failure or if you experience any of the following symptoms:  Weight gain of 3 pounds or more overnight or 5 pounds in a week, increased swelling in our hands or feet or shortness of breath while lying flat in bed.   Please call your doctor as soon as you notice any of these symptoms; do not wait until your next office visit. The discharge information has been reviewed with the {PATIENT PARENT GUARDIAN:[id number]}. The {PATIENT PARENT GUARDIAN:[id number]} verbalized understanding. Discharge medications reviewed with the {[employer] meds reviewed ZK:[id number]} and appropriate educational materials and side effects teaching were provided.   ___________________________________________________________________________________________________________________________________

## 2019-10-18 ENCOUNTER — ANESTHESIA EVENT (OUTPATIENT)
Dept: SURGERY | Age: 79
End: 2019-10-18
Payer: MEDICARE

## 2019-10-22 NOTE — PERIOP NOTES
Patient's caregiver informed that the patient will need to be at the hospital by [address] [place] [address] Rd[address]. Caregiver verbalized understanding.

## 2019-10-23 ENCOUNTER — HOSPITAL ENCOUNTER (OUTPATIENT)
Age: 79
Setting detail: OUTPATIENT SURGERY
Discharge: HOME | End: 2019-10-23
Attending: PSYCHIATRY & NEUROLOGY | Admitting: PSYCHIATRY & NEUROLOGY
Payer: MEDICARE

## 2019-10-23 ENCOUNTER — ANESTHESIA (OUTPATIENT)
Dept: SURGERY | Age: 79
End: 2019-10-23
Payer: MEDICARE

## 2019-10-23 VITALS
HEART RATE: 78 BPM | TEMPERATURE: 97.5 F | WEIGHT: 154 LBS | RESPIRATION RATE: 11 BRPM | DIASTOLIC BLOOD PRESSURE: 65 MMHG | OXYGEN SATURATION: 95 % | SYSTOLIC BLOOD PRESSURE: 131 MMHG | HEIGHT: 63 IN | BODY MASS INDEX: 27.29 KG/M2

## 2019-10-23 PROCEDURE — 90870 ELECTROCONVULSIVE THERAPY: CPT | Performed by: PSYCHIATRY & NEUROLOGY

## 2019-10-23 PROCEDURE — 74011250636 HC RX REV CODE- 250/636: Performed by: ANESTHESIOLOGY

## 2019-10-23 PROCEDURE — 74011000250 HC RX REV CODE- 250: Performed by: ANESTHESIOLOGY

## 2019-10-23 PROCEDURE — 74780000002 HC ELECTROSHOCK THERAP RECOVERY: Performed by: PSYCHIATRY & NEUROLOGY

## 2019-10-23 RX ORDER — FENTANYL CITRATE 50 UG/ML
25 INJECTION, SOLUTION INTRAMUSCULAR; INTRAVENOUS
Status: DISCONTINUED | OUTPATIENT
Start: 2019-10-23 | End: 2019-10-23 | Stop reason: HOSPADM

## 2019-10-23 RX ORDER — SODIUM CHLORIDE 0.9 % (FLUSH) 0.9 %
5-40 SYRINGE (ML) INJECTION EVERY 8 HOURS
Status: DISCONTINUED | OUTPATIENT
Start: 2019-10-23 | End: 2019-10-23 | Stop reason: HOSPADM

## 2019-10-23 RX ORDER — METHOHEXITAL IN WATER/PF 100MG/10ML
SYRINGE (ML) INTRAVENOUS AS NEEDED
Status: DISCONTINUED | OUTPATIENT
Start: 2019-10-23 | End: 2019-10-23 | Stop reason: HOSPADM

## 2019-10-23 RX ORDER — DICLOFENAC SODIUM 10 MG/G
GEL TOPICAL 4 TIMES DAILY
Status: ON HOLD | COMMUNITY
End: 2019-11-20

## 2019-10-23 RX ORDER — SODIUM CHLORIDE 9 MG/ML
50 INJECTION, SOLUTION INTRAVENOUS CONTINUOUS
Status: DISCONTINUED | OUTPATIENT
Start: 2019-10-23 | End: 2019-10-23 | Stop reason: HOSPADM

## 2019-10-23 RX ORDER — SODIUM CHLORIDE 0.9 % (FLUSH) 0.9 %
5-40 SYRINGE (ML) INJECTION AS NEEDED
Status: DISCONTINUED | OUTPATIENT
Start: 2019-10-23 | End: 2019-10-23 | Stop reason: SDUPTHER

## 2019-10-23 RX ORDER — HYDROMORPHONE HYDROCHLORIDE 1 MG/ML
0.5 INJECTION, SOLUTION INTRAMUSCULAR; INTRAVENOUS; SUBCUTANEOUS
Status: DISCONTINUED | OUTPATIENT
Start: 2019-10-23 | End: 2019-10-23 | Stop reason: HOSPADM

## 2019-10-23 RX ORDER — POLYETHYLENE GLYCOL 3350 17 G/17G
17 POWDER, FOR SOLUTION ORAL DAILY
Status: ON HOLD | COMMUNITY
End: 2019-11-20

## 2019-10-23 RX ORDER — SODIUM CHLORIDE 0.9 % (FLUSH) 0.9 %
5-40 SYRINGE (ML) INJECTION EVERY 8 HOURS
Status: DISCONTINUED | OUTPATIENT
Start: 2019-10-23 | End: 2019-10-23 | Stop reason: SDUPTHER

## 2019-10-23 RX ORDER — LIDOCAINE HYDROCHLORIDE 10 MG/ML
0.1 INJECTION, SOLUTION EPIDURAL; INFILTRATION; INTRACAUDAL; PERINEURAL AS NEEDED
Status: DISCONTINUED | OUTPATIENT
Start: 2019-10-23 | End: 2019-10-23 | Stop reason: HOSPADM

## 2019-10-23 RX ORDER — SODIUM CHLORIDE 0.9 % (FLUSH) 0.9 %
5-40 SYRINGE (ML) INJECTION AS NEEDED
Status: DISCONTINUED | OUTPATIENT
Start: 2019-10-23 | End: 2019-10-23 | Stop reason: HOSPADM

## 2019-10-23 RX ORDER — SUCCINYLCHOLINE CHLORIDE 20 MG/ML
INJECTION INTRAMUSCULAR; INTRAVENOUS AS NEEDED
Status: DISCONTINUED | OUTPATIENT
Start: 2019-10-23 | End: 2019-10-23 | Stop reason: HOSPADM

## 2019-10-23 RX ORDER — SODIUM CHLORIDE, SODIUM LACTATE, POTASSIUM CHLORIDE, CALCIUM CHLORIDE 600; 310; 30; 20 MG/100ML; MG/100ML; MG/100ML; MG/100ML
50 INJECTION, SOLUTION INTRAVENOUS CONTINUOUS
Status: DISCONTINUED | OUTPATIENT
Start: 2019-10-23 | End: 2019-10-23 | Stop reason: HOSPADM

## 2019-10-23 RX ADMIN — Medication 60 MG: at 08:32

## 2019-10-23 RX ADMIN — SUCCINYLCHOLINE CHLORIDE 40 MG: 20 INJECTION, SOLUTION INTRAMUSCULAR; INTRAVENOUS at 08:32

## 2019-10-23 NOTE — PROCEDURES
Electro Convulsive Therapy:  Treatment number 28     Maintenance ECT yes Q4-6 weeks   Janessa Wallace was admitted to the PACU for ECT. Patient was medically cleared and NPO for procedure. Patient gave informed consent for ECT treatment.       Patient received       Bilateral ECT yes      at 40 % Energy, under general anesthesia. Janessa Wallace with a good, well generalized seizure of 26 seconds on observation of the motor manifestations of the seizure. EEG duration was 27 secs. Recovery was unremarkable and with no complications.      Change in Energy %:              Anesthesia medications administered during procedure:  Brevital:  60 mg  Change for next treament:      Succinylcholine: 40 mg  Change for next treatment:     Propofol: mg  Glycopyrrolate:  mg  Labetalol:  mg  Esmolol:  mg  Lorazepam:  mg  Ketamine:  mg  Zofran:   mg    Toradol:  mg  Lidocaine:  mg  Caffeine Benzoate: mg               Skin over forehead and temples to be cleaned with Alcohol swab prior to procedure        CSSRS 10/23/2019 9/11/2019 8/16/2019   In the past month, have you wished you were dead or wished you could go to sleep and not wake up? No No No   In the past month, have you had any actual thoughts of killing yourself? No No No   In your lifetime, have you ever done anything, started to do anything, or prepared to do anything to end your life? Yes No Yes   Was this within the past 3 months?  No - No

## 2019-10-23 NOTE — PERIOP NOTES
Handoff Report from Operating Room to PACU    Report received from Belen Meckel and Dr Thomas Mcgowan regarding Isaiah Toth. Surgeon(s):  Junior Barajas MD  And Procedure(s) (LRB):  ELECTRO CONVULSIVE THERAPY (N/A)  confirmed   with allergies discussed. Anesthesia type, drugs, patient history, complications, estimated blood loss, vital signs, intake and output, and lines and temperature were reviewed.

## 2019-10-23 NOTE — DISCHARGE INSTRUCTIONS
ECT DISCHARGE INSTRUCTIONS      NAME: Janessa Wallace   :  1940   MRN:  [id number]     Date/Time:  10/23/2019 8:36 AM    ADMIT DATE: 10/23/2019     DISCHARGE DATE: 10/23/2019     DISCHARGE DIAGNOSIS:  There are no discharge diagnoses documented for the most recent discharge. Recommended diet:  As tolerated. Recommended activity:      Have a responsible person stay with the patient for 24 hours after the treatment, some temporary confusion may be noticed. Do not allow a patient to operate a motor vehicle for at least 24 hours and all the confusion has cleared. Do not drink alcoholic beverages for 48 hours past treatment. Do not sign any legal documents. Do not make any critical decisions for 24 hours. Advance diet as tolerated. Start with liquids and advance it nausea is not present. Understand that memory for recent events, phone numbers, addresses, ect. May be decreased for a short period of time. Report any persistant nausea or pain to the treating physician. Patient receiving ECT while in the hospital should also not attempt to ambulate without assistance, please use tap bell which will be provide. If you experience any of the following symptoms then please call your primary care physician/outpatient psychiatrist or return to the emergency room if you cannot get hold of your doctor: Fever, chills, nausea, vomiting, diarrhea, change in mentation, falling, bleeding, shortness of breath. Please call the emergency room at [facility] [phone] in this case. Follow Up:  Continue outpatient psychiatric follow-up visits with your personal psychiatrist.       Information obtained by :  I understand that if any problems occur once I am at home I am to contact my physician. I understand and acknowledge receipt of the instructions indicated above. Physician's or R.N.'s Signature                                                             Date/Time                                                                                                                                              Patient or Representative Signature                                                     Date/Time      Handoff Report from Operating Room to PACU    DISCHARGE SUMMARY from Nurse    PATIENT INSTRUCTIONS:    After general anesthesia or intravenous sedation, for 24 hours or while taking prescription Narcotics:  · Limit your activities  · Do not drive and operate hazardous machinery  · Do not make important personal or business decisions  · Do  not drink alcoholic beverages  · If you have not urinated within 8 hours after discharge, please contact your surgeon on call. Report the following to your surgeon:  · Excessive pain, swelling, redness or odor of or around the surgical area  · Temperature over 100.5  · Nausea and vomiting lasting longer than 4 hours or if unable to take medications  · Any signs of decreased circulation or nerve impairment to extremity: change in color, persistent  numbness, tingling, coldness or increase pain  · Any questions      These are general instructions for a healthy lifestyle:    No smoking/ No tobacco products/ Avoid exposure to second hand smoke  Surgeon General's Warning:  Quitting smoking now greatly reduces serious risk to your health.     Obesity, smoking, and sedentary lifestyle greatly increases your risk for illness    A healthy diet, regular physical exercise & weight monitoring are important for maintaining a healthy lifestyle    You may be retaining fluid if you have a history of heart failure or if you experience any of the following symptoms:  Weight gain of 3 pounds or more overnight or 5 pounds in a week, increased swelling in our hands or feet or shortness of breath while lying flat in bed. Please call your doctor as soon as you notice any of these symptoms; do not wait until your next office visit. The discharge information has been reviewed with the patient and guardian. The patient and guardian verbalized understanding. Discharge medications reviewed with the patient and guardian and appropriate educational materials and side effects teaching were provided.   ___________________________________________________________________________________________________________________________________

## 2019-10-23 NOTE — ANESTHESIA POSTPROCEDURE EVALUATION
Procedure(s):  ELECTRO CONVULSIVE THERAPY. general    Anesthesia Post Evaluation      Multimodal analgesia: multimodal analgesia not used between 6 hours prior to anesthesia start to PACU discharge  Patient location during evaluation: PACU  Level of consciousness: awake  Pain management: adequate  Airway patency: patent  Anesthetic complications: no  Cardiovascular status: acceptable  Respiratory status: acceptable  Hydration status: acceptable  Post anesthesia nausea and vomiting:  none      No vitals data found for the desired time range.

## 2019-11-15 ENCOUNTER — ANESTHESIA EVENT (OUTPATIENT)
Dept: SURGERY | Age: 79
End: 2019-11-15
Payer: SELF-PAY

## 2019-11-19 NOTE — PERIOP NOTES
Patient's nephew informed to have the patient at the hospital no later than 0600 to prepare for treatment; understanding verbalized.

## 2019-11-20 ENCOUNTER — HOSPITAL ENCOUNTER (OUTPATIENT)
Age: 79
Setting detail: OUTPATIENT SURGERY
Discharge: HOME | End: 2019-11-20
Attending: PSYCHIATRY & NEUROLOGY | Admitting: PSYCHIATRY & NEUROLOGY
Payer: SELF-PAY

## 2019-11-20 ENCOUNTER — ANESTHESIA (OUTPATIENT)
Dept: SURGERY | Age: 79
End: 2019-11-20
Payer: SELF-PAY

## 2019-11-20 VITALS
TEMPERATURE: 97.5 F | SYSTOLIC BLOOD PRESSURE: 122 MMHG | OXYGEN SATURATION: 95 % | HEART RATE: 80 BPM | BODY MASS INDEX: 27.29 KG/M2 | DIASTOLIC BLOOD PRESSURE: 58 MMHG | HEIGHT: 63 IN | WEIGHT: 154 LBS | RESPIRATION RATE: 14 BRPM

## 2019-11-20 PROCEDURE — 74780000002 HC ELECTROSHOCK THERAP RECOVERY: Performed by: PSYCHIATRY & NEUROLOGY

## 2019-11-20 PROCEDURE — 76060000031 HC ANESTHESIA FIRST 0.5 HR

## 2019-11-20 PROCEDURE — 90870 ELECTROCONVULSIVE THERAPY: CPT | Performed by: PSYCHIATRY & NEUROLOGY

## 2019-11-20 PROCEDURE — 74011250636 HC RX REV CODE- 250/636

## 2019-11-20 PROCEDURE — 74011250636 HC RX REV CODE- 250/636: Performed by: ANESTHESIOLOGY

## 2019-11-20 PROCEDURE — 74011000250 HC RX REV CODE- 250: Performed by: ANESTHESIOLOGY

## 2019-11-20 RX ORDER — SODIUM CHLORIDE 0.9 % (FLUSH) 0.9 %
5-40 SYRINGE (ML) INJECTION AS NEEDED
Status: DISCONTINUED | OUTPATIENT
Start: 2019-11-20 | End: 2019-11-20 | Stop reason: HOSPADM

## 2019-11-20 RX ORDER — SODIUM CHLORIDE 0.9 % (FLUSH) 0.9 %
5-40 SYRINGE (ML) INJECTION AS NEEDED
Status: DISCONTINUED | OUTPATIENT
Start: 2019-11-20 | End: 2019-11-20 | Stop reason: SDUPTHER

## 2019-11-20 RX ORDER — METHOHEXITAL IN WATER/PF 100MG/10ML
SYRINGE (ML) INTRAVENOUS AS NEEDED
Status: DISCONTINUED | OUTPATIENT
Start: 2019-11-20 | End: 2019-11-20 | Stop reason: HOSPADM

## 2019-11-20 RX ORDER — CALCIUM CARBONATE 500(1250)
TABLET ORAL DAILY
COMMUNITY

## 2019-11-20 RX ORDER — HYDROCHLOROTHIAZIDE 12.5 MG/1
12.5 TABLET ORAL DAILY
COMMUNITY

## 2019-11-20 RX ORDER — SODIUM CHLORIDE 9 MG/ML
50 INJECTION, SOLUTION INTRAVENOUS CONTINUOUS
Status: DISCONTINUED | OUTPATIENT
Start: 2019-11-20 | End: 2019-11-20 | Stop reason: HOSPADM

## 2019-11-20 RX ORDER — SODIUM CHLORIDE 0.9 % (FLUSH) 0.9 %
5-40 SYRINGE (ML) INJECTION EVERY 8 HOURS
Status: DISCONTINUED | OUTPATIENT
Start: 2019-11-20 | End: 2019-11-20 | Stop reason: HOSPADM

## 2019-11-20 RX ORDER — SODIUM CHLORIDE 0.9 % (FLUSH) 0.9 %
5-40 SYRINGE (ML) INJECTION EVERY 8 HOURS
Status: DISCONTINUED | OUTPATIENT
Start: 2019-11-20 | End: 2019-11-20 | Stop reason: SDUPTHER

## 2019-11-20 RX ORDER — SODIUM CHLORIDE, SODIUM LACTATE, POTASSIUM CHLORIDE, CALCIUM CHLORIDE 600; 310; 30; 20 MG/100ML; MG/100ML; MG/100ML; MG/100ML
50 INJECTION, SOLUTION INTRAVENOUS CONTINUOUS
Status: DISCONTINUED | OUTPATIENT
Start: 2019-11-20 | End: 2019-11-20 | Stop reason: HOSPADM

## 2019-11-20 RX ORDER — HYDROMORPHONE HYDROCHLORIDE 1 MG/ML
0.5 INJECTION, SOLUTION INTRAMUSCULAR; INTRAVENOUS; SUBCUTANEOUS
Status: DISCONTINUED | OUTPATIENT
Start: 2019-11-20 | End: 2019-11-20 | Stop reason: HOSPADM

## 2019-11-20 RX ORDER — LOXAPINE SUCCINATE 10 MG/1
10 TABLET ORAL 3 TIMES DAILY
COMMUNITY

## 2019-11-20 RX ORDER — SUCCINYLCHOLINE CHLORIDE 20 MG/ML
INJECTION INTRAMUSCULAR; INTRAVENOUS AS NEEDED
Status: DISCONTINUED | OUTPATIENT
Start: 2019-11-20 | End: 2019-11-20 | Stop reason: HOSPADM

## 2019-11-20 RX ORDER — LIDOCAINE HYDROCHLORIDE 10 MG/ML
0.1 INJECTION, SOLUTION EPIDURAL; INFILTRATION; INTRACAUDAL; PERINEURAL AS NEEDED
Status: DISCONTINUED | OUTPATIENT
Start: 2019-11-20 | End: 2019-11-20 | Stop reason: HOSPADM

## 2019-11-20 RX ORDER — SODIUM CHLORIDE 9 MG/ML
INJECTION, SOLUTION INTRAVENOUS
Status: DISCONTINUED | OUTPATIENT
Start: 2019-11-20 | End: 2019-11-20 | Stop reason: HOSPADM

## 2019-11-20 RX ADMIN — Medication 60 MG: at 08:21

## 2019-11-20 RX ADMIN — SUCCINYLCHOLINE CHLORIDE 30 MG: 20 INJECTION, SOLUTION INTRAMUSCULAR; INTRAVENOUS at 08:21

## 2019-11-20 RX ADMIN — SODIUM CHLORIDE: 9 INJECTION, SOLUTION INTRAVENOUS at 07:18

## 2019-11-20 NOTE — PROCEDURES
Electro Convulsive Therapy:  Treatment number 29     Maintenance ECT yes Q4-6 weeks   Dea Low was admitted to the PACU for ECT. Patient was medically cleared and NPO for procedure. Patient gave informed consent for ECT treatment.       Patient received       Bilateral ECT yes      at 40 % Energy, under general anesthesia. Dea Low with a good, well generalized seizure of 25 seconds on observation of the motor manifestations of the seizure. EEG duration was NA secs. Recovery was unremarkable and with no complications.      Change in Energy %:              Anesthesia medications administered during procedure:  Brevital:  60 mg  Change for next treament:      Succinylcholine: 30 mg  Change for next treatment:     Propofol: mg  Glycopyrrolate:  mg  Labetalol:  mg  Esmolol:  mg  Lorazepam:  mg  Ketamine:  mg  Zofran:   mg    Toradol:  mg  Lidocaine:  mg  Caffeine Benzoate: mg               Skin over forehead and temples to be cleaned with Alcohol swab prior to procedure        CSSRS 11/20/2019 10/23/2019 9/11/2019   1) Within the past month, have you wished you were dead or wished you could go to sleep and not wake up? No No No   2) Have you actually had any thoughts of killing yourself? No No No   6) Have you ever done anything, started to do anything, or prepared to do anything to end your life?  No Yes No   Did this occur within the past 3 months? - No -

## 2019-11-20 NOTE — ANESTHESIA POSTPROCEDURE EVALUATION
Procedure(s):  ELECTRO CONVULSIVE THERAPY.     general    Anesthesia Post Evaluation      Multimodal analgesia: multimodal analgesia not used between 6 hours prior to anesthesia start to PACU discharge  Patient location during evaluation: PACU  Patient participation: complete - patient participated  Level of consciousness: awake  Pain management: adequate  Airway patency: patent  Anesthetic complications: no  Cardiovascular status: acceptable  Respiratory status: acceptable  Hydration status: acceptable  Post anesthesia nausea and vomiting:  none      Vitals Value Taken Time   /58 11/20/2019  8:55 AM   Temp 36.3 °C (97.4 °F) 11/20/2019  8:33 AM   Pulse 78 11/20/2019  8:55 AM   Resp 12 11/20/2019  8:55 AM   SpO2 95 % 11/20/2019  8:55 AM

## 2019-12-16 ENCOUNTER — ANESTHESIA EVENT (OUTPATIENT)
Dept: SURGERY | Age: 79
End: 2019-12-16
Payer: SELF-PAY

## 2019-12-18 ENCOUNTER — HOSPITAL ENCOUNTER (OUTPATIENT)
Age: 79
Setting detail: OUTPATIENT SURGERY
Discharge: HOME | End: 2019-12-18
Attending: PSYCHIATRY & NEUROLOGY | Admitting: PSYCHIATRY & NEUROLOGY
Payer: SELF-PAY

## 2019-12-18 ENCOUNTER — ANESTHESIA (OUTPATIENT)
Dept: SURGERY | Age: 79
End: 2019-12-18
Payer: SELF-PAY

## 2019-12-18 VITALS
OXYGEN SATURATION: 95 % | WEIGHT: 154 LBS | TEMPERATURE: 97.8 F | BODY MASS INDEX: 27.29 KG/M2 | RESPIRATION RATE: 12 BRPM | HEIGHT: 63 IN | HEART RATE: 68 BPM | SYSTOLIC BLOOD PRESSURE: 126 MMHG | DIASTOLIC BLOOD PRESSURE: 68 MMHG

## 2019-12-18 PROCEDURE — 76060000031 HC ANESTHESIA FIRST 0.5 HR

## 2019-12-18 PROCEDURE — 90870 ELECTROCONVULSIVE THERAPY: CPT | Performed by: PSYCHIATRY & NEUROLOGY

## 2019-12-18 PROCEDURE — 74011000250 HC RX REV CODE- 250: Performed by: ANESTHESIOLOGY

## 2019-12-18 PROCEDURE — 74011250636 HC RX REV CODE- 250/636: Performed by: ANESTHESIOLOGY

## 2019-12-18 PROCEDURE — 74780000002 HC ELECTROSHOCK THERAP RECOVERY: Performed by: PSYCHIATRY & NEUROLOGY

## 2019-12-18 RX ORDER — POLYETHYLENE GLYCOL 3350 17 G/17G
17 POWDER, FOR SOLUTION ORAL DAILY
COMMUNITY

## 2019-12-18 RX ORDER — SODIUM CHLORIDE 0.9 % (FLUSH) 0.9 %
5-40 SYRINGE (ML) INJECTION EVERY 8 HOURS
Status: DISCONTINUED | OUTPATIENT
Start: 2019-12-18 | End: 2019-12-18 | Stop reason: HOSPADM

## 2019-12-18 RX ORDER — LIDOCAINE HYDROCHLORIDE 10 MG/ML
0.1 INJECTION, SOLUTION EPIDURAL; INFILTRATION; INTRACAUDAL; PERINEURAL AS NEEDED
Status: DISCONTINUED | OUTPATIENT
Start: 2019-12-18 | End: 2019-12-18 | Stop reason: HOSPADM

## 2019-12-18 RX ORDER — HYDROMORPHONE HYDROCHLORIDE 1 MG/ML
0.5 INJECTION, SOLUTION INTRAMUSCULAR; INTRAVENOUS; SUBCUTANEOUS
Status: DISCONTINUED | OUTPATIENT
Start: 2019-12-18 | End: 2019-12-18 | Stop reason: HOSPADM

## 2019-12-18 RX ORDER — SODIUM CHLORIDE 0.9 % (FLUSH) 0.9 %
5-40 SYRINGE (ML) INJECTION AS NEEDED
Status: DISCONTINUED | OUTPATIENT
Start: 2019-12-18 | End: 2019-12-18 | Stop reason: SDUPTHER

## 2019-12-18 RX ORDER — SODIUM CHLORIDE 0.9 % (FLUSH) 0.9 %
5-40 SYRINGE (ML) INJECTION EVERY 8 HOURS
Status: DISCONTINUED | OUTPATIENT
Start: 2019-12-18 | End: 2019-12-18 | Stop reason: SDUPTHER

## 2019-12-18 RX ORDER — SODIUM CHLORIDE 9 MG/ML
50 INJECTION, SOLUTION INTRAVENOUS CONTINUOUS
Status: DISCONTINUED | OUTPATIENT
Start: 2019-12-18 | End: 2019-12-18 | Stop reason: HOSPADM

## 2019-12-18 RX ORDER — OLANZAPINE 15 MG/1
15 TABLET ORAL
COMMUNITY

## 2019-12-18 RX ORDER — QUETIAPINE FUMARATE 100 MG/1
50 TABLET, FILM COATED ORAL 2 TIMES DAILY
COMMUNITY

## 2019-12-18 RX ORDER — SODIUM CHLORIDE 0.9 % (FLUSH) 0.9 %
5-40 SYRINGE (ML) INJECTION AS NEEDED
Status: DISCONTINUED | OUTPATIENT
Start: 2019-12-18 | End: 2019-12-18 | Stop reason: HOSPADM

## 2019-12-18 RX ORDER — SODIUM CHLORIDE, SODIUM LACTATE, POTASSIUM CHLORIDE, CALCIUM CHLORIDE 600; 310; 30; 20 MG/100ML; MG/100ML; MG/100ML; MG/100ML
50 INJECTION, SOLUTION INTRAVENOUS CONTINUOUS
Status: DISCONTINUED | OUTPATIENT
Start: 2019-12-18 | End: 2019-12-18 | Stop reason: HOSPADM

## 2019-12-18 RX ORDER — SUCCINYLCHOLINE CHLORIDE 20 MG/ML
INJECTION INTRAMUSCULAR; INTRAVENOUS AS NEEDED
Status: DISCONTINUED | OUTPATIENT
Start: 2019-12-18 | End: 2019-12-18 | Stop reason: HOSPADM

## 2019-12-18 RX ORDER — METHOHEXITAL IN WATER/PF 100MG/10ML
SYRINGE (ML) INTRAVENOUS AS NEEDED
Status: DISCONTINUED | OUTPATIENT
Start: 2019-12-18 | End: 2019-12-18 | Stop reason: HOSPADM

## 2019-12-18 RX ORDER — DICLOFENAC SODIUM 10 MG/G
GEL TOPICAL 4 TIMES DAILY
COMMUNITY

## 2019-12-18 RX ADMIN — Medication 60 MG: at 08:41

## 2019-12-18 RX ADMIN — SUCCINYLCHOLINE CHLORIDE 30 MG: 20 INJECTION, SOLUTION INTRAMUSCULAR; INTRAVENOUS at 08:41

## 2019-12-18 RX ADMIN — SODIUM CHLORIDE: 900 INJECTION, SOLUTION INTRAVENOUS at 08:38

## 2019-12-18 NOTE — PERIOP NOTES
Handoff Report from Operating Room to PACU    Report received from [address] and DARIO Cm MD regarding Cam Ortez. Surgeon(s):  Desi Carr MD  And Procedure(s) (LRB):  ELECTRO CONVULSIVE THERAPY (N/A)  confirmed   with allergies discussed. Anesthesia type, drugs, patient history, complications, estimated blood loss, vital signs, intake and output, and lines and temperature were reviewed. PT ADM TO PACU BAY 3= SEDATED/POSTICAL. RESP EVEN AND UNLABORED. POX ON RA > 93%. RH IV SITE BENIGN. SR UP X2.

## 2019-12-18 NOTE — ANESTHESIA POSTPROCEDURE EVALUATION
Procedure(s):  ELECTRO CONVULSIVE THERAPY. general    Anesthesia Post Evaluation      Multimodal analgesia: multimodal analgesia not used between 6 hours prior to anesthesia start to PACU discharge  Patient location during evaluation: PACU  Patient participation: complete - patient cannot participate  Level of consciousness: awake  Pain management: adequate  Airway patency: patent  Anesthetic complications: no  Cardiovascular status: acceptable  Respiratory status: acceptable  Hydration status: acceptable  Post anesthesia nausea and vomiting:  none      Vitals Value Taken Time   /68 12/18/2019  9:20 AM   Temp 36.9 °C (98.4 °F) 12/18/2019  8:54 AM   Pulse 68 12/18/2019  9:23 AM   Resp 15 12/18/2019  9:23 AM   SpO2 95 % 12/18/2019  9:23 AM   Vitals shown include unvalidated device data.

## 2019-12-18 NOTE — PROCEDURES
Electro Convulsive Therapy:  Treatment number 30     Maintenance ECT yes Q4-6 weeks   Niki Marquez was admitted to the PACU for ECT. Patient was medically cleared and NPO for procedure. Patient gave informed consent for ECT treatment.       Patient received       Bilateral ECT yes      at 40 % Energy, under general anesthesia. Niki Marquez with a good, well generalized seizure of 31 seconds on observation of the motor manifestations of the seizure. EEG duration was NA secs. Recovery was unremarkable and with no complications.      Change in Energy %:              Anesthesia medications administered during procedure:  Brevital:  60 mg  Change for next treament:      Succinylcholine: 30 mg  Change for next treatment:     Propofol: mg  Glycopyrrolate:  mg  Labetalol:  mg  Esmolol:  mg  Lorazepam:  mg  Ketamine:  mg  Zofran:   mg    Toradol:  mg  Lidocaine:  mg  Caffeine Benzoate: mg               Skin over forehead and temples to be cleaned with Alcohol swab prior to procedure        CSSRS 12/18/2019 11/20/2019 10/23/2019   1) Within the past month, have you wished you were dead or wished you could go to sleep and not wake up? No No No   2) Have you actually had any thoughts of killing yourself? No No No   6) Have you ever done anything, started to do anything, or prepared to do anything to end your life? Yes No Yes   Did this occur within the past 3 months?  No - No

## 2019-12-18 NOTE — DISCHARGE INSTRUCTIONS
DISCHARGE SUMMARY from Nurse    PATIENT INSTRUCTIONS:    After general anesthesia or intravenous sedation, for 24 hours or while taking prescription Narcotics:  · Limit your activities  · Do not drive and operate hazardous machinery  · Do not make important personal or business decisions  · Do  not drink alcoholic beverages  · If you have not urinated within 8 hours after discharge, please contact your surgeon on call. Report the following to your surgeon:  · Excessive pain, swelling, redness or odor of or around the surgical area  · Temperature over 100.5  · Nausea and vomiting lasting longer than 4 hours or if unable to take medications  · Any signs of decreased circulation or nerve impairment to extremity: change in color, persistent  numbness, tingling, coldness or increase pain  · Any questions      *  Please update this list whenever your medications are discontinued, doses are      changed, or new medications (including over-the-counter products) are added. *  Please carry medication information at all times in case of emergency situations. These are general instructions for a healthy lifestyle:    No smoking/ No tobacco products/ Avoid exposure to second hand smoke  Surgeon General's Warning:  Quitting smoking now greatly reduces serious risk to your health. Obesity, smoking, and sedentary lifestyle greatly increases your risk for illness    A healthy diet, regular physical exercise & weight monitoring are important for maintaining a healthy lifestyle    You may be retaining fluid if you have a history of heart failure or if you experience any of the following symptoms:  Weight gain of 3 pounds or more overnight or 5 pounds in a week, increased swelling in our hands or feet or shortness of breath while lying flat in bed. Please call your doctor as soon as you notice any of these symptoms; do not wait until your next office visit.         The discharge information has been reviewed with the patient and Foster Go. The patient and Foster Go verbalized understanding. Discharge medications reviewed with the patient and IVORY and appropriate educational materials and side effects teaching were provided. ___________________________________________________________________________________________________________________________________                        ECT DISCHARGE INSTRUCTIONS      NAME: Ruma Pyle   :  1940   MRN:  [id number]     Date/Time:  2019 8:45 AM    ADMIT DATE: 2019     DISCHARGE DATE: 2019     DISCHARGE DIAGNOSIS:  There are no discharge diagnoses documented for the most recent discharge. Recommended diet:  As tolerated. Recommended activity:      Have a responsible person stay with the patient for 24 hours after the treatment, some temporary confusion may be noticed. Do not allow a patient to operate a motor vehicle for at least 24 hours and all the confusion has cleared. Do not drink alcoholic beverages for 48 hours past treatment. Do not sign any legal documents. Do not make any critical decisions for 24 hours. Advance diet as tolerated. Start with liquids and advance it nausea is not present. Understand that memory for recent events, phone numbers, addresses, ect. May be decreased for a short period of time. Report any persistant nausea or pain to the treating physician. Patient receiving ECT while in the hospital should also not attempt to ambulate without assistance, please use tap bell which will be provide. If you experience any of the following symptoms then please call your primary care physician/outpatient psychiatrist or return to the emergency room if you cannot get hold of your doctor: Fever, chills, nausea, vomiting, diarrhea, change in mentation, falling, bleeding, shortness of breath. Please call the emergency room at [facility] [phone] in this case.      Follow Up:  Continue outpatient psychiatric follow-up visits with your personal psychiatrist.       Information obtained by :  I understand that if any problems occur once I am at home I am to contact my physician. I understand and acknowledge receipt of the instructions indicated above.                                                                                                                                            Physician's or R.N.'s Signature                                                             Date/Time                                                                                                                                              Patient or Representative Signature                                                     Date/Time

## 2019-12-18 NOTE — PERIOP NOTES
PT EASILY AROUSABLE - OX4. PT SLOW TO RESPOND VERBALLY. MORA WEAKLY AND TO COMMAND. RESP EVEN AND UNLABORED. POX ON RA > 93%. 46 - NEPHEW IVORY AT BEDSIDE - DISCHARGE INSTRUCTIONS REVIEWED WITH PT AND NEPHEW -BOTH VERBALIZE UNDERSTANDING.  8231 PT DISCHARGED TO HOME IN CARE OF NEPHEW Carlos Schmitt. PT DENIES DISCOMFORT 0/10 ON PAIN SCALE AT TIME OF DISCHARGE.

## 2019-12-18 NOTE — PERIOP NOTES
PT EASILY AROUSABLE - OX4. MORA TO COMMAND. DENIES DISCOMFORT 0/10 ON PAIN SCALE. READILY FALLS BACK TO SLEEP.

## 2020-01-10 ENCOUNTER — ANESTHESIA EVENT (OUTPATIENT)
Dept: SURGERY | Age: 80
End: 2020-01-10
Payer: COMMERCIAL

## 2020-01-14 NOTE — PERIOP NOTES
Patient's nephew instructed to have the patient at the hospital no later than 0645 tomorrow for procedure. Patient's nephew verbalized understanding.

## 2020-01-15 ENCOUNTER — ANESTHESIA (OUTPATIENT)
Dept: SURGERY | Age: 80
End: 2020-01-15
Payer: COMMERCIAL

## 2020-01-15 ENCOUNTER — HOSPITAL ENCOUNTER (OUTPATIENT)
Age: 80
Setting detail: OUTPATIENT SURGERY
Discharge: HOME | End: 2020-01-15
Attending: PSYCHIATRY & NEUROLOGY | Admitting: PSYCHIATRY & NEUROLOGY
Payer: COMMERCIAL

## 2020-01-15 VITALS
BODY MASS INDEX: 27.29 KG/M2 | OXYGEN SATURATION: 94 % | DIASTOLIC BLOOD PRESSURE: 59 MMHG | HEART RATE: 73 BPM | HEIGHT: 63 IN | WEIGHT: 154 LBS | TEMPERATURE: 97.9 F | SYSTOLIC BLOOD PRESSURE: 127 MMHG | RESPIRATION RATE: 11 BRPM

## 2020-01-15 PROCEDURE — 90870 ELECTROCONVULSIVE THERAPY: CPT | Performed by: PSYCHIATRY & NEUROLOGY

## 2020-01-15 PROCEDURE — 74780000002 HC ELECTROSHOCK THERAP RECOVERY: Performed by: PSYCHIATRY & NEUROLOGY

## 2020-01-15 PROCEDURE — 76060000031 HC ANESTHESIA FIRST 0.5 HR

## 2020-01-15 PROCEDURE — 74011250636 HC RX REV CODE- 250/636: Performed by: ANESTHESIOLOGY

## 2020-01-15 PROCEDURE — 74011000250 HC RX REV CODE- 250: Performed by: ANESTHESIOLOGY

## 2020-01-15 PROCEDURE — 74011250636 HC RX REV CODE- 250/636

## 2020-01-15 RX ORDER — SODIUM CHLORIDE 0.9 % (FLUSH) 0.9 %
5-40 SYRINGE (ML) INJECTION AS NEEDED
Status: DISCONTINUED | OUTPATIENT
Start: 2020-01-15 | End: 2020-01-15 | Stop reason: HOSPADM

## 2020-01-15 RX ORDER — METHOHEXITAL IN WATER/PF 100MG/10ML
SYRINGE (ML) INTRAVENOUS AS NEEDED
Status: DISCONTINUED | OUTPATIENT
Start: 2020-01-15 | End: 2020-01-15 | Stop reason: HOSPADM

## 2020-01-15 RX ORDER — LIDOCAINE HYDROCHLORIDE 10 MG/ML
0.1 INJECTION, SOLUTION EPIDURAL; INFILTRATION; INTRACAUDAL; PERINEURAL AS NEEDED
Status: DISCONTINUED | OUTPATIENT
Start: 2020-01-15 | End: 2020-01-15 | Stop reason: HOSPADM

## 2020-01-15 RX ORDER — SODIUM CHLORIDE 0.9 % (FLUSH) 0.9 %
5-40 SYRINGE (ML) INJECTION AS NEEDED
Status: DISCONTINUED | OUTPATIENT
Start: 2020-01-15 | End: 2020-01-15 | Stop reason: SDUPTHER

## 2020-01-15 RX ORDER — SUCCINYLCHOLINE CHLORIDE 20 MG/ML
INJECTION INTRAMUSCULAR; INTRAVENOUS AS NEEDED
Status: DISCONTINUED | OUTPATIENT
Start: 2020-01-15 | End: 2020-01-15 | Stop reason: HOSPADM

## 2020-01-15 RX ORDER — SODIUM CHLORIDE 9 MG/ML
50 INJECTION, SOLUTION INTRAVENOUS CONTINUOUS
Status: DISCONTINUED | OUTPATIENT
Start: 2020-01-15 | End: 2020-01-15 | Stop reason: HOSPADM

## 2020-01-15 RX ORDER — HYDROMORPHONE HYDROCHLORIDE 1 MG/ML
0.5 INJECTION, SOLUTION INTRAMUSCULAR; INTRAVENOUS; SUBCUTANEOUS
Status: DISCONTINUED | OUTPATIENT
Start: 2020-01-15 | End: 2020-01-15 | Stop reason: HOSPADM

## 2020-01-15 RX ORDER — SODIUM CHLORIDE 0.9 % (FLUSH) 0.9 %
5-40 SYRINGE (ML) INJECTION EVERY 8 HOURS
Status: DISCONTINUED | OUTPATIENT
Start: 2020-01-15 | End: 2020-01-15 | Stop reason: HOSPADM

## 2020-01-15 RX ORDER — FENTANYL CITRATE 50 UG/ML
25 INJECTION, SOLUTION INTRAMUSCULAR; INTRAVENOUS
Status: DISCONTINUED | OUTPATIENT
Start: 2020-01-15 | End: 2020-01-15 | Stop reason: HOSPADM

## 2020-01-15 RX ORDER — SODIUM CHLORIDE, SODIUM LACTATE, POTASSIUM CHLORIDE, CALCIUM CHLORIDE 600; 310; 30; 20 MG/100ML; MG/100ML; MG/100ML; MG/100ML
50 INJECTION, SOLUTION INTRAVENOUS CONTINUOUS
Status: DISCONTINUED | OUTPATIENT
Start: 2020-01-15 | End: 2020-01-15 | Stop reason: HOSPADM

## 2020-01-15 RX ORDER — SODIUM CHLORIDE 0.9 % (FLUSH) 0.9 %
5-40 SYRINGE (ML) INJECTION EVERY 8 HOURS
Status: DISCONTINUED | OUTPATIENT
Start: 2020-01-15 | End: 2020-01-15 | Stop reason: SDUPTHER

## 2020-01-15 RX ORDER — SODIUM CHLORIDE 9 MG/ML
INJECTION, SOLUTION INTRAVENOUS
Status: DISCONTINUED | OUTPATIENT
Start: 2020-01-15 | End: 2020-01-15 | Stop reason: HOSPADM

## 2020-01-15 RX ADMIN — SODIUM CHLORIDE: 9 INJECTION, SOLUTION INTRAVENOUS at 08:18

## 2020-01-15 RX ADMIN — SUCCINYLCHOLINE CHLORIDE 30 MG: 20 INJECTION, SOLUTION INTRAMUSCULAR; INTRAVENOUS at 08:23

## 2020-01-15 RX ADMIN — Medication 60 MG: at 08:23

## 2020-01-15 NOTE — PERIOP NOTES
PT EASILY AROUSABLE - ORIENTED TO PERSON AND PLACE. CONTINUES TO DENY DISCOMFORT 0/10 ON PAIN SCALE. RESTING IN NAPS -SNORING.

## 2020-01-15 NOTE — PERIOP NOTES
PT EASILY AROUSABLE - OX4. MORA TO COMMAND, WEAKLY - AS PER BASELINE. RESP EVEN AND UNLABORED. POX ON RA > 93%. PT CRISTINA SIPS OF APPLE JUICE. IVORY -PT'S NEPHEW IN. DISCHARGE INSTRUCTIONS REVIEWED WITH PT AND IVORY. BOTH VERBALIZE UNDERSTANDING.  0950 -PT DISCHARGE IN CARE OF IVORY -NEPHEW. PT STATES 0/10 ON PAIN SCALE AT TIME OF DISCHARGE.

## 2020-01-15 NOTE — PERIOP NOTES
PT RESPONDS TO VERBAL STIMULI - OPENS EYES.  MORA WEAKLY AND TO COMMAND. DENIES DISCOMFORT 0/10 ON PAIN SCALE. READILY FALLS DOMINIQUE TO SLEEP.

## 2020-01-15 NOTE — DISCHARGE INSTRUCTIONS
DISCHARGE SUMMARY from Nurse    PATIENT INSTRUCTIONS:    After general anesthesia or intravenous sedation, for 24 hours or while taking prescription Narcotics:  · Limit your activities  · Do not drive and operate hazardous machinery  · Do not make important personal or business decisions  · Do  not drink alcoholic beverages  · If you have not urinated within 8 hours after discharge, please contact your surgeon on call. Report the following to your surgeon:  · Excessive pain, swelling, redness or odor of or around the surgical area  · Temperature over 100.5  · Nausea and vomiting lasting longer than 4 hours or if unable to take medications  · Any signs of decreased circulation or nerve impairment to extremity: change in color, persistent  numbness, tingling, coldness or increase pain  · Any questions    What to do at Home:  Recommended activity: Activity as tolerated and no driving for today,       *  Please give a list of your current medications to your Primary Care Provider. *  Please update this list whenever your medications are discontinued, doses are      changed, or new medications (including over-the-counter products) are added. *  Please carry medication information at all times in case of emergency situations. These are general instructions for a healthy lifestyle:    No smoking/ No tobacco products/ Avoid exposure to second hand smoke  Surgeon General's Warning:  Quitting smoking now greatly reduces serious risk to your health.     Obesity, smoking, and sedentary lifestyle greatly increases your risk for illness    A healthy diet, regular physical exercise & weight monitoring are important for maintaining a healthy lifestyle    You may be retaining fluid if you have a history of heart failure or if you experience any of the following symptoms:  Weight gain of 3 pounds or more overnight or 5 pounds in a week, increased swelling in our hands or feet or shortness of breath while lying flat in bed.  Please call your doctor as soon as you notice any of these symptoms; do not wait until your next office visit. The discharge information has been reviewed with the patient and Stefani Thompson. The patient and Stefani Thompson verbalized understanding. Discharge medications reviewed with the patient and IVORY and appropriate educational materials and side effects teaching were provided. ___________________________________________________________________________________________________________________________________                        ECT DISCHARGE INSTRUCTIONS      NAME: Annabella Diop   :  1940   MRN:  [id number]     Date/Time:  1/15/2020 8:28 AM    ADMIT DATE: 1/15/2020     DISCHARGE DATE: 1/15/2020     DISCHARGE DIAGNOSIS:  There are no discharge diagnoses documented for the most recent discharge. Recommended diet:  As tolerated. Recommended activity:      Have a responsible person stay with the patient for 24 hours after the treatment, some temporary confusion may be noticed. Do not allow a patient to operate a motor vehicle for at least 24 hours and all the confusion has cleared. Do not drink alcoholic beverages for 48 hours past treatment. Do not sign any legal documents. Do not make any critical decisions for 24 hours. Advance diet as tolerated. Start with liquids and advance it nausea is not present. Understand that memory for recent events, phone numbers, addresses, ect. May be decreased for a short period of time. Report any persistant nausea or pain to the treating physician. Patient receiving ECT while in the hospital should also not attempt to ambulate without assistance, please use tap bell which will be provide.     If you experience any of the following symptoms then please call your primary care physician/outpatient psychiatrist or return to the emergency room if you cannot get hold of your doctor: Fever, chills, nausea, vomiting, diarrhea, change in mentation, falling, bleeding, shortness of breath. Please call the emergency room at [facility] [phone] in this case. Follow Up:  Continue outpatient psychiatric follow-up visits with your personal psychiatrist.       Information obtained by :  I understand that if any problems occur once I am at home I am to contact my physician. I understand and acknowledge receipt of the instructions indicated above.                                                                                                                                            Physician's or R.N.'s Signature                                                             Date/Time                                                                                                                                              Patient or Representative Signature                                                     Date/Time

## 2020-01-15 NOTE — PROCEDURES
Electro Convulsive Therapy:  Treatment number 31     Maintenance ECT yes Q4-6 weeks   Babar Chicas was admitted to the PACU for ECT. Patient was medically cleared and NPO for procedure. Patient gave informed consent for ECT treatment.       Patient received       Bilateral ECT yes      at 40 % Energy, under general anesthesia. Babar Chicas with a good, well generalized seizure of 29 seconds on observation of the motor manifestations of the seizure. EEG duration was 26 secs. Recovery was unremarkable and with no complications.      Change in Energy %:              Anesthesia medications administered during procedure:  Brevital:  60 mg  Change for next treament:      Succinylcholine: 30 mg  Change for next treatment:     Propofol: mg  Glycopyrrolate:  mg  Labetalol:  mg  Esmolol:  mg  Lorazepam:  mg  Ketamine:  mg  Zofran:   mg    Toradol:  mg  Lidocaine:  mg  Caffeine Benzoate: mg               Skin over forehead and temples to be cleaned with Alcohol swab prior to procedure        CSSRS 1/15/2020 12/18/2019 11/20/2019   1) Within the past month, have you wished you were dead or wished you could go to sleep and not wake up? No No No   2) Have you actually had any thoughts of killing yourself? No No No   6) Have you ever done anything, started to do anything, or prepared to do anything to end your life?  No Yes No   Did this occur within the past 3 months? - No -

## 2020-01-15 NOTE — PERIOP NOTES
Handoff Report from Operating Room to PACU    Report received from [facility] and BRAD/ARYAN MELO regarding Checo Matias. Surgeon(s):  Roberta Peñaloza MD  And Procedure(s) (LRB):  ELECTRO CONVULSIVE THERAPY (N/A)  confirmed   with allergies discussed. Anesthesia type, drugs, patient history, complications, estimated blood loss, vital signs, intake and output, and lines and temperature were reviewed. PT ADM TO PACU BAY 3 - SEDATED. RESP EVEN AND UNLABORED. POX ON RA > 93%. RW IV SITE BENIGN. SR UP X2.

## 2020-01-15 NOTE — ANESTHESIA POSTPROCEDURE EVALUATION
Procedure(s):  ELECTRO CONVULSIVE THERAPY.     general    Anesthesia Post Evaluation      Multimodal analgesia: multimodal analgesia not used between 6 hours prior to anesthesia start to PACU discharge  Patient location during evaluation: PACU  Patient participation: complete - patient cannot participate  Level of consciousness: awake  Pain management: adequate  Airway patency: patent  Anesthetic complications: no  Cardiovascular status: acceptable  Respiratory status: acceptable  Post anesthesia nausea and vomiting:  none      Vitals Value Taken Time   /58 1/15/2020  9:05 AM   Temp 36.6 °C (97.9 °F) 1/15/2020  8:34 AM   Pulse 76 1/15/2020  9:05 AM   Resp 11 1/15/2020  9:05 AM   SpO2 96 % 1/15/2020  9:05 AM

## 2020-02-21 ENCOUNTER — ANESTHESIA EVENT (OUTPATIENT)
Dept: SURGERY | Age: 80
End: 2020-02-21
Payer: COMMERCIAL

## 2020-02-26 ENCOUNTER — ANESTHESIA (OUTPATIENT)
Dept: SURGERY | Age: 80
End: 2020-02-26
Payer: COMMERCIAL

## 2020-02-26 ENCOUNTER — HOSPITAL ENCOUNTER (OUTPATIENT)
Age: 80
Setting detail: OUTPATIENT SURGERY
Discharge: HOME | End: 2020-02-26
Attending: PSYCHIATRY & NEUROLOGY | Admitting: PSYCHIATRY & NEUROLOGY
Payer: COMMERCIAL

## 2020-02-26 VITALS
HEIGHT: 63 IN | BODY MASS INDEX: 27.29 KG/M2 | RESPIRATION RATE: 13 BRPM | HEART RATE: 68 BPM | SYSTOLIC BLOOD PRESSURE: 131 MMHG | DIASTOLIC BLOOD PRESSURE: 60 MMHG | OXYGEN SATURATION: 95 % | WEIGHT: 154 LBS | TEMPERATURE: 97.6 F

## 2020-02-26 PROCEDURE — 74011250636 HC RX REV CODE- 250/636: Performed by: ANESTHESIOLOGY

## 2020-02-26 PROCEDURE — 74011000250 HC RX REV CODE- 250: Performed by: ANESTHESIOLOGY

## 2020-02-26 PROCEDURE — 76060000031 HC ANESTHESIA FIRST 0.5 HR

## 2020-02-26 PROCEDURE — 90870 ELECTROCONVULSIVE THERAPY: CPT | Performed by: PSYCHIATRY & NEUROLOGY

## 2020-02-26 PROCEDURE — 74011250636 HC RX REV CODE- 250/636

## 2020-02-26 PROCEDURE — 74780000002 HC ELECTROSHOCK THERAP RECOVERY: Performed by: PSYCHIATRY & NEUROLOGY

## 2020-02-26 RX ORDER — METHOHEXITAL IN WATER/PF 100MG/10ML
SYRINGE (ML) INTRAVENOUS AS NEEDED
Status: DISCONTINUED | OUTPATIENT
Start: 2020-02-26 | End: 2020-02-26 | Stop reason: HOSPADM

## 2020-02-26 RX ORDER — SODIUM CHLORIDE 0.9 % (FLUSH) 0.9 %
5-40 SYRINGE (ML) INJECTION EVERY 8 HOURS
Status: DISCONTINUED | OUTPATIENT
Start: 2020-02-26 | End: 2020-02-26 | Stop reason: HOSPADM

## 2020-02-26 RX ORDER — LIDOCAINE HYDROCHLORIDE 10 MG/ML
0.1 INJECTION, SOLUTION EPIDURAL; INFILTRATION; INTRACAUDAL; PERINEURAL AS NEEDED
Status: DISCONTINUED | OUTPATIENT
Start: 2020-02-26 | End: 2020-02-26 | Stop reason: HOSPADM

## 2020-02-26 RX ORDER — SUCCINYLCHOLINE CHLORIDE 20 MG/ML
INJECTION INTRAMUSCULAR; INTRAVENOUS AS NEEDED
Status: DISCONTINUED | OUTPATIENT
Start: 2020-02-26 | End: 2020-02-26 | Stop reason: HOSPADM

## 2020-02-26 RX ORDER — SODIUM CHLORIDE 0.9 % (FLUSH) 0.9 %
5-40 SYRINGE (ML) INJECTION AS NEEDED
Status: DISCONTINUED | OUTPATIENT
Start: 2020-02-26 | End: 2020-02-26 | Stop reason: HOSPADM

## 2020-02-26 RX ORDER — HYDROMORPHONE HYDROCHLORIDE 1 MG/ML
0.5 INJECTION, SOLUTION INTRAMUSCULAR; INTRAVENOUS; SUBCUTANEOUS
Status: DISCONTINUED | OUTPATIENT
Start: 2020-02-26 | End: 2020-02-26 | Stop reason: HOSPADM

## 2020-02-26 RX ORDER — SODIUM CHLORIDE 9 MG/ML
50 INJECTION, SOLUTION INTRAVENOUS CONTINUOUS
Status: DISCONTINUED | OUTPATIENT
Start: 2020-02-26 | End: 2020-02-26 | Stop reason: HOSPADM

## 2020-02-26 RX ORDER — SODIUM CHLORIDE 9 MG/ML
INJECTION, SOLUTION INTRAVENOUS
Status: DISCONTINUED | OUTPATIENT
Start: 2020-02-26 | End: 2020-02-26 | Stop reason: HOSPADM

## 2020-02-26 RX ORDER — SODIUM CHLORIDE, SODIUM LACTATE, POTASSIUM CHLORIDE, CALCIUM CHLORIDE 600; 310; 30; 20 MG/100ML; MG/100ML; MG/100ML; MG/100ML
50 INJECTION, SOLUTION INTRAVENOUS CONTINUOUS
Status: DISCONTINUED | OUTPATIENT
Start: 2020-02-26 | End: 2020-02-26 | Stop reason: HOSPADM

## 2020-02-26 RX ADMIN — SUCCINYLCHOLINE CHLORIDE 30 MG: 20 INJECTION, SOLUTION INTRAMUSCULAR; INTRAVENOUS at 08:35

## 2020-02-26 RX ADMIN — SODIUM CHLORIDE: 9 INJECTION, SOLUTION INTRAVENOUS at 07:10

## 2020-02-26 RX ADMIN — Medication 60 MG: at 08:35

## 2020-02-26 NOTE — PERIOP NOTES
Handoff Report from Operating Room to PACU    Report received from JJ Maurice RN and William Damon MD regarding Cher Moss. Surgeon(s):  Jennyfer Childs MD  And Procedure(s) (LRB):  ELECTRO CONVULSIVE THERAPY (N/A)  confirmed   with allergies discussed. Anesthesia type, drugs, patient history, complications, estimated blood loss, vital signs, intake and output, and lines and temperature were reviewed.

## 2020-02-26 NOTE — PROCEDURES
Electro Convulsive Therapy:  Treatment number 32     Maintenance ECT yes Q4-6 weeks   Clint Wall was admitted to the PACU for ECT. Patient was medically cleared and NPO for procedure. Patient gave informed consent for ECT treatment.       Patient received       Bilateral ECT yes      at 40 % Energy, under general anesthesia. Clint Wall with a good, well generalized seizure of 28 seconds on observation of the motor manifestations of the seizure. EEG duration was 31 secs. Recovery was unremarkable and with no complications. PSI - 73.7%    Change in Energy %:              Anesthesia medications administered during procedure:  Brevital:  60 mg  Change for next treament:      Succinylcholine: 30 mg  Change for next treatment:     Propofol: mg  Glycopyrrolate:  mg  Labetalol:  mg  Esmolol:  mg  Lorazepam:  mg  Ketamine:  mg  Zofran:   mg    Toradol:  mg  Lidocaine:  mg  Caffeine Benzoate: mg            Skin over forehead and temples to be cleaned with Alcohol swab prior to procedure        CSSRS 2/26/2020 1/15/2020 12/18/2019   1) Within the past month, have you wished you were dead or wished you could go to sleep and not wake up? No No No   2) Have you actually had any thoughts of killing yourself? - No No   3) Have you been thinking about how you might kill yourself? No - -   6) Have you ever done anything, started to do anything, or prepared to do anything to end your life? Yes No Yes   Did this occur within the past 3 months?  No - No

## 2020-02-26 NOTE — DISCHARGE INSTRUCTIONS
ECT DISCHARGE INSTRUCTIONS      NAME: Domenico Castorena   :  1940   MRN:  [id number]     Date/Time:  2020 8:40 AM    ADMIT DATE: 2020     DISCHARGE DATE: 2020     DISCHARGE DIAGNOSIS:  There are no discharge diagnoses documented for the most recent discharge. Recommended diet:  As tolerated. Recommended activity:      Have a responsible person stay with the patient for 24 hours after the treatment, some temporary confusion may be noticed. Do not allow a patient to operate a motor vehicle for at least 24 hours and all the confusion has cleared. Do not drink alcoholic beverages for 48 hours past treatment. Do not sign any legal documents. Do not make any critical decisions for 24 hours. Advance diet as tolerated. Start with liquids and advance it nausea is not present. Understand that memory for recent events, phone numbers, addresses, ect. May be decreased for a short period of time. Report any persistant nausea or pain to the treating physician. Patient receiving ECT while in the hospital should also not attempt to ambulate without assistance, please use tap bell which will be provide. If you experience any of the following symptoms then please call your primary care physician/outpatient psychiatrist or return to the emergency room if you cannot get hold of your doctor: Fever, chills, nausea, vomiting, diarrhea, change in mentation, falling, bleeding, shortness of breath. Please call the emergency room at [facility] [phone] in this case. Follow Up:  Continue outpatient psychiatric follow-up visits with your personal psychiatrist.       Information obtained by :  I understand that if any problems occur once I am at home I am to contact my physician. I understand and acknowledge receipt of the instructions indicated above. Physician's or R.N.'s Signature                                                             Date/Time                                                                                                                                              Patient or Representative Signature                                                     Date/Time

## 2020-02-26 NOTE — ANESTHESIA POSTPROCEDURE EVALUATION
Procedure(s):  ELECTRO CONVULSIVE THERAPY. general    Anesthesia Post Evaluation      Multimodal analgesia: multimodal analgesia not used between 6 hours prior to anesthesia start to PACU discharge  Patient location during evaluation: PACU  Patient participation: complete - patient cannot participate  Level of consciousness: awake  Pain management: adequate  Airway patency: patent  Anesthetic complications: no  Cardiovascular status: acceptable  Respiratory status: acceptable  Hydration status: acceptable  Post anesthesia nausea and vomiting:  none      Vitals Value Taken Time   /60 2/26/2020  9:10 AM   Temp 36.5 °C (97.7 °F) 2/26/2020  8:46 AM   Pulse 68 2/26/2020  9:14 AM   Resp 13 2/26/2020  9:14 AM   SpO2 95 % 2/26/2020  9:14 AM   Vitals shown include unvalidated device data.

## 2020-02-26 NOTE — ROUTINE PROCESS
DISCHARGE SUMMARY from Nurse PATIENT INSTRUCTIONS: 
 
After general anesthesia or intravenous sedation, for 24 hours or while taking prescription Narcotics: · Limit your activities · Do not drive and operate hazardous machinery · Do not make important personal or business decisions · Do  not drink alcoholic beverages · If you have not urinated within 8 hours after discharge, please contact your surgeon on call. Report the following to your surgeon: 
· Excessive pain, swelling, redness or odor of or around the surgical area · Temperature over 100.5 · Nausea and vomiting lasting longer than 4 hours or if unable to take medications · Any signs of decreased circulation or nerve impairment to extremity: change in color, persistent  numbness, tingling, coldness or increase pain · Any questions *  Please give a list of your current medications to your Primary Care Provider. *  Please update this list whenever your medications are discontinued, doses are 
    changed, or new medications (including over-the-counter products) are added. *  Please carry medication information at all times in case of emergency situations. These are general instructions for a healthy lifestyle: No smoking/ No tobacco products/ Avoid exposure to second hand smoke Surgeon General's Warning:  Quitting smoking now greatly reduces serious risk to your health. Obesity, smoking, and sedentary lifestyle greatly increases your risk for illness A healthy diet, regular physical exercise & weight monitoring are important for maintaining a healthy lifestyle You may be retaining fluid if you have a history of heart failure or if you experience any of the following symptoms:  Weight gain of 3 pounds or more overnight or 5 pounds in a week, increased swelling in our hands or feet or shortness of breath while lying flat in bed.   Please call your doctor as soon as you notice any of these symptoms; do not wait until your next office visit. The discharge information has been reviewed with the caregiver. The caregiver verbalized understanding. Discharge medications reviewed with the caregiver and appropriate educational materials and side effects teaching were provided. ____________________________________________________________________pt stable and back to baseline behavior. No complaints stated._______________________________________________________________

## (undated) DEVICE — ELECTRODE EKG PED PREGELLED FOAM

## (undated) DEVICE — GEL ELECTRD 8.5OZ HIGHLY COND BACTSTAT H2O SOL NONSTAINING